# Patient Record
Sex: FEMALE | Race: WHITE | NOT HISPANIC OR LATINO | Employment: UNEMPLOYED | URBAN - METROPOLITAN AREA
[De-identification: names, ages, dates, MRNs, and addresses within clinical notes are randomized per-mention and may not be internally consistent; named-entity substitution may affect disease eponyms.]

---

## 2023-03-21 ENCOUNTER — TELEPHONE (OUTPATIENT)
Dept: PRIMARY CARE | Facility: CLINIC | Age: 53
End: 2023-03-21
Payer: COMMERCIAL

## 2023-03-21 DIAGNOSIS — I10 HYPERTENSION, UNSPECIFIED TYPE: Primary | ICD-10-CM

## 2023-03-21 NOTE — TELEPHONE ENCOUNTER
Pt has been on amlodipine 10mg. For about a month now and her ankles are very swollen and very hard to walk on them especially in the morning. Pt also advise she is very fatigue and doesn't have any energy. Pt wanted to know if there is any other medication she could take. Please call and advise

## 2023-03-22 RX ORDER — LOSARTAN POTASSIUM 25 MG/1
25 TABLET ORAL DAILY
Qty: 30 TABLET | Refills: 0 | Status: SHIPPED | OUTPATIENT
Start: 2023-03-22 | End: 2023-04-13 | Stop reason: SDUPTHER

## 2023-04-13 DIAGNOSIS — I10 HYPERTENSION, UNSPECIFIED TYPE: ICD-10-CM

## 2023-04-13 RX ORDER — LOSARTAN POTASSIUM 25 MG/1
25 TABLET ORAL DAILY
Qty: 90 TABLET | Refills: 0 | Status: SHIPPED | OUTPATIENT
Start: 2023-04-13 | End: 2023-04-17

## 2023-04-13 NOTE — TELEPHONE ENCOUNTER
The patient was requesting a 90 day refill on Losartan - entered. The patient was also asking for a referral to see a surgeon for a prolapse.

## 2023-04-14 DIAGNOSIS — I10 HYPERTENSION, UNSPECIFIED TYPE: ICD-10-CM

## 2023-04-17 RX ORDER — LOSARTAN POTASSIUM 25 MG/1
TABLET ORAL
Qty: 30 TABLET | Refills: 1 | Status: SHIPPED | OUTPATIENT
Start: 2023-04-17 | End: 2023-06-19 | Stop reason: SDUPTHER

## 2023-06-06 ENCOUNTER — TELEPHONE (OUTPATIENT)
Dept: PRIMARY CARE | Facility: CLINIC | Age: 53
End: 2023-06-06
Payer: COMMERCIAL

## 2023-06-06 NOTE — TELEPHONE ENCOUNTER
Pt set up an appt. With Shirley for 7/17/23 for her physical and would like to get her blood work done prior to her appt. Please call and advise when in the system

## 2023-06-07 DIAGNOSIS — Z00.00 ROUTINE GENERAL MEDICAL EXAMINATION AT A HEALTH CARE FACILITY: Primary | ICD-10-CM

## 2023-06-07 NOTE — TELEPHONE ENCOUNTER
Called and left a VM that fasting labs were ordered.    Office number left to call back with questions.

## 2023-06-19 DIAGNOSIS — I10 HYPERTENSION, UNSPECIFIED TYPE: ICD-10-CM

## 2023-06-19 RX ORDER — LOSARTAN POTASSIUM 25 MG/1
25 TABLET ORAL DAILY
Qty: 90 TABLET | Refills: 0 | Status: SHIPPED | OUTPATIENT
Start: 2023-06-19 | End: 2023-07-17 | Stop reason: SDUPTHER

## 2023-06-28 PROBLEM — N83.00 FOLLICULAR CYST OF OVARY: Status: ACTIVE | Noted: 2023-06-28

## 2023-06-28 PROBLEM — N39.0 UTI (URINARY TRACT INFECTION): Status: ACTIVE | Noted: 2023-06-28

## 2023-06-28 PROBLEM — R10.2 PELVIC PAIN IN FEMALE: Status: ACTIVE | Noted: 2023-06-28

## 2023-06-28 PROBLEM — R61 HYPERHIDROSIS: Status: ACTIVE | Noted: 2023-06-28

## 2023-06-28 PROBLEM — I10 BENIGN ESSENTIAL HYPERTENSION: Status: ACTIVE | Noted: 2023-06-28

## 2023-06-28 PROBLEM — F90.8 ADHD, ADULT RESIDUAL TYPE: Status: ACTIVE | Noted: 2023-06-28

## 2023-06-28 PROBLEM — M54.2 CERVICAL PAIN: Status: ACTIVE | Noted: 2023-06-28

## 2023-06-28 PROBLEM — M85.80 OSTEOPENIA: Status: ACTIVE | Noted: 2023-06-28

## 2023-06-28 PROBLEM — N81.2 CYSTOCELE WITH INCOMPLETE UTEROVAGINAL PROLAPSE: Status: ACTIVE | Noted: 2023-06-28

## 2023-06-28 PROBLEM — N81.9 PROLAPSE OF FEMALE PELVIC ORGANS: Status: ACTIVE | Noted: 2023-06-28

## 2023-06-28 PROBLEM — J01.00 ACUTE MAXILLARY SINUSITIS: Status: ACTIVE | Noted: 2023-06-28

## 2023-06-28 PROBLEM — K52.9 COLITIS, ACUTE: Status: ACTIVE | Noted: 2023-06-28

## 2023-06-28 PROBLEM — R35.0 URINARY FREQUENCY: Status: ACTIVE | Noted: 2023-06-28

## 2023-06-28 PROBLEM — F41.9 ANXIETY: Status: ACTIVE | Noted: 2023-06-28

## 2023-06-29 ENCOUNTER — HOSPITAL ENCOUNTER (OUTPATIENT)
Dept: DATA CONVERSION | Facility: HOSPITAL | Age: 53
End: 2023-06-29
Attending: OBSTETRICS & GYNECOLOGY | Admitting: OBSTETRICS & GYNECOLOGY
Payer: COMMERCIAL

## 2023-06-29 DIAGNOSIS — N81.2 INCOMPLETE UTEROVAGINAL PROLAPSE: ICD-10-CM

## 2023-06-29 DIAGNOSIS — N81.9 FEMALE GENITAL PROLAPSE, UNSPECIFIED: ICD-10-CM

## 2023-06-29 DIAGNOSIS — D64.9 ANEMIA, UNSPECIFIED: ICD-10-CM

## 2023-06-29 DIAGNOSIS — I10 ESSENTIAL (PRIMARY) HYPERTENSION: ICD-10-CM

## 2023-06-29 DIAGNOSIS — N72 INFLAMMATORY DISEASE OF CERVIX UTERI: ICD-10-CM

## 2023-06-29 DIAGNOSIS — N80.03 ADENOMYOSIS OF THE UTERUS: ICD-10-CM

## 2023-06-29 DIAGNOSIS — F41.9 ANXIETY DISORDER, UNSPECIFIED: ICD-10-CM

## 2023-06-29 DIAGNOSIS — F90.9 ATTENTION-DEFICIT HYPERACTIVITY DISORDER, UNSPECIFIED TYPE: ICD-10-CM

## 2023-07-10 ENCOUNTER — LAB (OUTPATIENT)
Dept: LAB | Facility: LAB | Age: 53
End: 2023-07-10
Payer: COMMERCIAL

## 2023-07-10 DIAGNOSIS — I10 HYPERTENSION, UNSPECIFIED TYPE: ICD-10-CM

## 2023-07-10 DIAGNOSIS — Z00.00 ROUTINE GENERAL MEDICAL EXAMINATION AT A HEALTH CARE FACILITY: ICD-10-CM

## 2023-07-10 LAB
ALANINE AMINOTRANSFERASE (SGPT) (U/L) IN SER/PLAS: 12 U/L (ref 7–45)
ALBUMIN (G/DL) IN SER/PLAS: 4.5 G/DL (ref 3.4–5)
ALKALINE PHOSPHATASE (U/L) IN SER/PLAS: 54 U/L (ref 33–110)
ANION GAP IN SER/PLAS: 13 MMOL/L (ref 10–20)
ASPARTATE AMINOTRANSFERASE (SGOT) (U/L) IN SER/PLAS: 16 U/L (ref 9–39)
BASOPHILS (10*3/UL) IN BLOOD BY AUTOMATED COUNT: 0.05 X10E9/L (ref 0–0.1)
BASOPHILS/100 LEUKOCYTES IN BLOOD BY AUTOMATED COUNT: 0.7 % (ref 0–2)
BILIRUBIN TOTAL (MG/DL) IN SER/PLAS: 0.4 MG/DL (ref 0–1.2)
CALCIDIOL (25 OH VITAMIN D3) (NG/ML) IN SER/PLAS: 44 NG/ML
CALCIUM (MG/DL) IN SER/PLAS: 9.6 MG/DL (ref 8.6–10.3)
CARBON DIOXIDE, TOTAL (MMOL/L) IN SER/PLAS: 28 MMOL/L (ref 21–32)
CHLORIDE (MMOL/L) IN SER/PLAS: 104 MMOL/L (ref 98–107)
CHOLESTEROL (MG/DL) IN SER/PLAS: 205 MG/DL (ref 0–199)
CHOLESTEROL IN HDL (MG/DL) IN SER/PLAS: 64.8 MG/DL
CHOLESTEROL/HDL RATIO: 3.2
COMPLETE PATHOLOGY REPORT: NORMAL
CONVERTED CLINICAL DIAGNOSIS-HISTORY: NORMAL
CONVERTED FINAL DIAGNOSIS: NORMAL
CONVERTED FINAL REPORT PDF LINK TO COPY AND PASTE: NORMAL
CONVERTED GROSS DESCRIPTION: NORMAL
CREATININE (MG/DL) IN SER/PLAS: 0.81 MG/DL (ref 0.5–1.05)
EOSINOPHILS (10*3/UL) IN BLOOD BY AUTOMATED COUNT: 0.21 X10E9/L (ref 0–0.7)
EOSINOPHILS/100 LEUKOCYTES IN BLOOD BY AUTOMATED COUNT: 3.1 % (ref 0–6)
ERYTHROCYTE DISTRIBUTION WIDTH (RATIO) BY AUTOMATED COUNT: 12.7 % (ref 11.5–14.5)
ERYTHROCYTE MEAN CORPUSCULAR HEMOGLOBIN CONCENTRATION (G/DL) BY AUTOMATED: 32 G/DL (ref 32–36)
ERYTHROCYTE MEAN CORPUSCULAR VOLUME (FL) BY AUTOMATED COUNT: 92 FL (ref 80–100)
ERYTHROCYTES (10*6/UL) IN BLOOD BY AUTOMATED COUNT: 4.12 X10E12/L (ref 4–5.2)
GFR FEMALE: 88 ML/MIN/1.73M2
GLUCOSE (MG/DL) IN SER/PLAS: 82 MG/DL (ref 74–99)
HEMATOCRIT (%) IN BLOOD BY AUTOMATED COUNT: 37.8 % (ref 36–46)
HEMOGLOBIN (G/DL) IN BLOOD: 12.1 G/DL (ref 12–16)
IMMATURE GRANULOCYTES/100 LEUKOCYTES IN BLOOD BY AUTOMATED COUNT: 0.9 % (ref 0–0.9)
LDL: 115 MG/DL (ref 0–99)
LEUKOCYTES (10*3/UL) IN BLOOD BY AUTOMATED COUNT: 6.8 X10E9/L (ref 4.4–11.3)
LYMPHOCYTES (10*3/UL) IN BLOOD BY AUTOMATED COUNT: 1.36 X10E9/L (ref 1.2–4.8)
LYMPHOCYTES/100 LEUKOCYTES IN BLOOD BY AUTOMATED COUNT: 20.1 % (ref 13–44)
MONOCYTES (10*3/UL) IN BLOOD BY AUTOMATED COUNT: 0.67 X10E9/L (ref 0.1–1)
MONOCYTES/100 LEUKOCYTES IN BLOOD BY AUTOMATED COUNT: 9.9 % (ref 2–10)
NEUTROPHILS (10*3/UL) IN BLOOD BY AUTOMATED COUNT: 4.41 X10E9/L (ref 1.2–7.7)
NEUTROPHILS/100 LEUKOCYTES IN BLOOD BY AUTOMATED COUNT: 65.3 % (ref 40–80)
PLATELETS (10*3/UL) IN BLOOD AUTOMATED COUNT: 312 X10E9/L (ref 150–450)
POTASSIUM (MMOL/L) IN SER/PLAS: 4.6 MMOL/L (ref 3.5–5.3)
PROTEIN TOTAL: 7.2 G/DL (ref 6.4–8.2)
SODIUM (MMOL/L) IN SER/PLAS: 140 MMOL/L (ref 136–145)
THYROTROPIN (MIU/L) IN SER/PLAS BY DETECTION LIMIT <= 0.05 MIU/L: 2.33 MIU/L (ref 0.44–3.98)
TRIGLYCERIDE (MG/DL) IN SER/PLAS: 128 MG/DL (ref 0–149)
UREA NITROGEN (MG/DL) IN SER/PLAS: 25 MG/DL (ref 6–23)
VLDL: 26 MG/DL (ref 0–40)

## 2023-07-10 PROCEDURE — 80053 COMPREHEN METABOLIC PANEL: CPT

## 2023-07-10 PROCEDURE — 82306 VITAMIN D 25 HYDROXY: CPT

## 2023-07-10 PROCEDURE — 36415 COLL VENOUS BLD VENIPUNCTURE: CPT

## 2023-07-10 PROCEDURE — 80061 LIPID PANEL: CPT

## 2023-07-10 PROCEDURE — 84443 ASSAY THYROID STIM HORMONE: CPT

## 2023-07-10 PROCEDURE — 85025 COMPLETE CBC W/AUTO DIFF WBC: CPT

## 2023-07-17 ENCOUNTER — TELEPHONE (OUTPATIENT)
Dept: PRIMARY CARE | Facility: CLINIC | Age: 53
End: 2023-07-17

## 2023-07-17 ENCOUNTER — OFFICE VISIT (OUTPATIENT)
Dept: PRIMARY CARE | Facility: CLINIC | Age: 53
End: 2023-07-17
Payer: COMMERCIAL

## 2023-07-17 VITALS
HEIGHT: 60 IN | WEIGHT: 123 LBS | OXYGEN SATURATION: 100 % | RESPIRATION RATE: 17 BRPM | DIASTOLIC BLOOD PRESSURE: 78 MMHG | HEART RATE: 74 BPM | SYSTOLIC BLOOD PRESSURE: 122 MMHG | BODY MASS INDEX: 24.15 KG/M2 | TEMPERATURE: 97.5 F

## 2023-07-17 DIAGNOSIS — Z00.00 ROUTINE ADULT HEALTH MAINTENANCE: Primary | ICD-10-CM

## 2023-07-17 DIAGNOSIS — F90.8 ADHD, ADULT RESIDUAL TYPE: ICD-10-CM

## 2023-07-17 DIAGNOSIS — I10 HYPERTENSION, UNSPECIFIED TYPE: ICD-10-CM

## 2023-07-17 DIAGNOSIS — M54.2 CERVICAL PAIN: ICD-10-CM

## 2023-07-17 DIAGNOSIS — I10 BENIGN ESSENTIAL HYPERTENSION: Chronic | ICD-10-CM

## 2023-07-17 PROCEDURE — 99396 PREV VISIT EST AGE 40-64: CPT | Performed by: NURSE PRACTITIONER

## 2023-07-17 PROCEDURE — 3078F DIAST BP <80 MM HG: CPT | Performed by: NURSE PRACTITIONER

## 2023-07-17 PROCEDURE — 3074F SYST BP LT 130 MM HG: CPT | Performed by: NURSE PRACTITIONER

## 2023-07-17 PROCEDURE — 1036F TOBACCO NON-USER: CPT | Performed by: NURSE PRACTITIONER

## 2023-07-17 RX ORDER — LISDEXAMFETAMINE DIMESYLATE 30 MG/1
30 CAPSULE ORAL
Qty: 90 CAPSULE | Refills: 0 | Status: SHIPPED | OUTPATIENT
Start: 2023-07-17 | End: 2023-08-24 | Stop reason: SDUPTHER

## 2023-07-17 RX ORDER — LOSARTAN POTASSIUM 25 MG/1
25 TABLET ORAL DAILY
Qty: 90 TABLET | Refills: 0 | Status: SHIPPED | OUTPATIENT
Start: 2023-07-17 | End: 2023-08-24 | Stop reason: SDUPTHER

## 2023-07-17 RX ORDER — PRASTERONE 6.5 MG/1
INSERT VAGINAL
COMMUNITY
End: 2024-03-20 | Stop reason: ALTCHOICE

## 2023-07-17 RX ORDER — GABAPENTIN 300 MG/1
300 CAPSULE ORAL NIGHTLY
COMMUNITY
End: 2023-07-17 | Stop reason: SDUPTHER

## 2023-07-17 RX ORDER — GABAPENTIN 300 MG/1
300 CAPSULE ORAL NIGHTLY
Qty: 90 CAPSULE | Refills: 0 | Status: SHIPPED | OUTPATIENT
Start: 2023-07-17 | End: 2023-08-24 | Stop reason: SDUPTHER

## 2023-07-17 RX ORDER — LISDEXAMFETAMINE DIMESYLATE 30 MG/1
30 CAPSULE ORAL
COMMUNITY
End: 2023-07-17 | Stop reason: SDUPTHER

## 2023-07-17 ASSESSMENT — ENCOUNTER SYMPTOMS
DEPRESSION: 0
LOSS OF SENSATION IN FEET: 0
OCCASIONAL FEELINGS OF UNSTEADINESS: 0

## 2023-07-17 ASSESSMENT — COLUMBIA-SUICIDE SEVERITY RATING SCALE - C-SSRS
2. HAVE YOU ACTUALLY HAD ANY THOUGHTS OF KILLING YOURSELF?: NO
6. HAVE YOU EVER DONE ANYTHING, STARTED TO DO ANYTHING, OR PREPARED TO DO ANYTHING TO END YOUR LIFE?: NO
1. IN THE PAST MONTH, HAVE YOU WISHED YOU WERE DEAD OR WISHED YOU COULD GO TO SLEEP AND NOT WAKE UP?: NO

## 2023-07-17 ASSESSMENT — PATIENT HEALTH QUESTIONNAIRE - PHQ9
1. LITTLE INTEREST OR PLEASURE IN DOING THINGS: NOT AT ALL
SUM OF ALL RESPONSES TO PHQ9 QUESTIONS 1 AND 2: 0
2. FEELING DOWN, DEPRESSED OR HOPELESS: NOT AT ALL

## 2023-07-17 ASSESSMENT — PAIN SCALES - GENERAL: PAINLEVEL: 0-NO PAIN

## 2023-07-17 NOTE — PROGRESS NOTES
"Subjective   Patient ID: Silva Chance is a 53 y.o. female who presents for Annual Exam (The patient is here for a complete physical. ).    Well Adult Physical   Patient here for a comprehensive physical exam.The patient reports no problems     History:  LMP: No LMP recorded.  Menopause at Hysterectomy 6/30/23      Had pelvic floor surgery and total hysterectomy 6/30/23         Review of Systems  otherwise negative aside from what was mentioned above in HPI.    Objective   /78 (BP Location: Left arm, Patient Position: Sitting)   Pulse 74   Temp 36.4 °C (97.5 °F)   Resp 17   Ht 1.511 m (4' 11.5\")   Wt 55.8 kg (123 lb)   SpO2 100%   BMI 24.43 kg/m²     Physical Exam  Vitals reviewed.   Constitutional:       Appearance: Normal appearance.   HENT:      Head: Normocephalic.      Right Ear: Tympanic membrane, ear canal and external ear normal.      Left Ear: Tympanic membrane, ear canal and external ear normal.      Nose: Nose normal.      Mouth/Throat:      Mouth: Mucous membranes are moist.   Eyes:      Conjunctiva/sclera: Conjunctivae normal.      Pupils: Pupils are equal, round, and reactive to light.   Cardiovascular:      Rate and Rhythm: Normal rate and regular rhythm.      Pulses: Normal pulses.      Heart sounds: Normal heart sounds.   Pulmonary:      Effort: Pulmonary effort is normal.      Breath sounds: Normal breath sounds.   Abdominal:      General: Abdomen is flat. Bowel sounds are normal.      Palpations: Abdomen is soft.   Musculoskeletal:         General: Normal range of motion.      Cervical back: Normal range of motion and neck supple.   Skin:     General: Skin is warm.   Neurological:      Mental Status: She is alert.   Psychiatric:         Mood and Affect: Mood normal.         Behavior: Behavior normal.         Thought Content: Thought content normal.         Judgment: Judgment normal.         Assessment/Plan   Problem List Items Addressed This Visit          Cardiac and Vasculature    " Benign essential hypertension (Chronic)     Stbale on losartan            Mental Health    ADHD, adult residual type     -CSA and UDS done 11/11/22  -does do medical marijuana  -discussed benefits and risks  -she may consider straterra in the future if Vyvanse makes BP up         Relevant Medications    lisdexamfetamine (Vyvanse) 30 mg capsule       Musculoskeletal and Injuries    Cervical pain - Primary    Relevant Medications    gabapentin (Neurontin) 300 mg capsule     Other Visit Diagnoses       Hypertension, unspecified type        Relevant Medications    losartan (Cozaar) 25 mg tablet                  Health Maintenance  -Pap smear: UTD with PCP in [place].   -Vaccinations: UTD shingrix  -Mammogram: 12/22 normal  -Colonoscopy: 10/2022; repeat 7 years.  -DEXA: in [place]

## 2023-07-17 NOTE — TELEPHONE ENCOUNTER
Pt just had an appt with Shirley and her blood work results were not discussed and pt would like to know the results. Please advise

## 2023-07-17 NOTE — ASSESSMENT & PLAN NOTE
-CSA and UDS done 11/11/22  -does do medical marijuana  -discussed benefits and risks  -she may consider straterra in the future if Vyvanse makes BP up

## 2023-08-24 DIAGNOSIS — F90.8 ADHD, ADULT RESIDUAL TYPE: ICD-10-CM

## 2023-08-24 DIAGNOSIS — I10 HYPERTENSION, UNSPECIFIED TYPE: ICD-10-CM

## 2023-08-24 DIAGNOSIS — M54.2 CERVICAL PAIN: ICD-10-CM

## 2023-08-24 RX ORDER — GABAPENTIN 300 MG/1
300 CAPSULE ORAL NIGHTLY
Qty: 90 CAPSULE | Refills: 0 | Status: SHIPPED | OUTPATIENT
Start: 2023-08-24 | End: 2023-11-06 | Stop reason: SDUPTHER

## 2023-08-24 RX ORDER — LISDEXAMFETAMINE DIMESYLATE 30 MG/1
30 CAPSULE ORAL
Qty: 90 CAPSULE | Refills: 0 | Status: SHIPPED | OUTPATIENT
Start: 2023-08-24 | End: 2023-09-11 | Stop reason: ALTCHOICE

## 2023-08-24 RX ORDER — LOSARTAN POTASSIUM 25 MG/1
25 TABLET ORAL DAILY
Qty: 90 TABLET | Refills: 0 | Status: SHIPPED | OUTPATIENT
Start: 2023-08-24 | End: 2023-11-06 | Stop reason: SDUPTHER

## 2023-09-11 ENCOUNTER — OFFICE VISIT (OUTPATIENT)
Dept: PRIMARY CARE | Facility: CLINIC | Age: 53
End: 2023-09-11
Payer: COMMERCIAL

## 2023-09-11 VITALS
SYSTOLIC BLOOD PRESSURE: 122 MMHG | BODY MASS INDEX: 25.42 KG/M2 | OXYGEN SATURATION: 98 % | WEIGHT: 128 LBS | RESPIRATION RATE: 16 BRPM | HEART RATE: 77 BPM | TEMPERATURE: 97.2 F | DIASTOLIC BLOOD PRESSURE: 88 MMHG

## 2023-09-11 DIAGNOSIS — Z12.31 ENCOUNTER FOR SCREENING MAMMOGRAM FOR MALIGNANT NEOPLASM OF BREAST: ICD-10-CM

## 2023-09-11 DIAGNOSIS — F40.243 ANXIETY WITH FLYING: ICD-10-CM

## 2023-09-11 DIAGNOSIS — F90.8 ADHD, ADULT RESIDUAL TYPE: ICD-10-CM

## 2023-09-11 DIAGNOSIS — I10 BENIGN ESSENTIAL HYPERTENSION: Chronic | ICD-10-CM

## 2023-09-11 DIAGNOSIS — N81.2 CYSTOCELE WITH INCOMPLETE UTEROVAGINAL PROLAPSE: Primary | ICD-10-CM

## 2023-09-11 PROBLEM — N39.0 UTI (URINARY TRACT INFECTION): Status: RESOLVED | Noted: 2023-06-28 | Resolved: 2023-09-11

## 2023-09-11 PROBLEM — J01.00 ACUTE MAXILLARY SINUSITIS: Status: RESOLVED | Noted: 2023-06-28 | Resolved: 2023-09-11

## 2023-09-11 PROCEDURE — 3079F DIAST BP 80-89 MM HG: CPT | Performed by: INTERNAL MEDICINE

## 2023-09-11 PROCEDURE — 1036F TOBACCO NON-USER: CPT | Performed by: INTERNAL MEDICINE

## 2023-09-11 PROCEDURE — 99214 OFFICE O/P EST MOD 30 MIN: CPT | Performed by: INTERNAL MEDICINE

## 2023-09-11 PROCEDURE — 3074F SYST BP LT 130 MM HG: CPT | Performed by: INTERNAL MEDICINE

## 2023-09-11 RX ORDER — LORAZEPAM 0.5 MG/1
0.5 TABLET ORAL DAILY PRN
Qty: 7 TABLET | Refills: 0 | Status: SHIPPED | OUTPATIENT
Start: 2023-09-11 | End: 2023-12-18 | Stop reason: ALTCHOICE

## 2023-09-11 RX ORDER — ATOMOXETINE 40 MG/1
CAPSULE ORAL
Qty: 60 CAPSULE | Refills: 0 | Status: SHIPPED | OUTPATIENT
Start: 2023-09-11 | End: 2023-12-18 | Stop reason: ALTCHOICE

## 2023-09-11 ASSESSMENT — ENCOUNTER SYMPTOMS
PALPITATIONS: 0
MYALGIAS: 0
CONSTIPATION: 0
CHEST TIGHTNESS: 0
BLOOD IN STOOL: 0
POLYDIPSIA: 0
RHINORRHEA: 0
DYSURIA: 0
ARTHRALGIAS: 0
FREQUENCY: 0
COUGH: 0
WHEEZING: 0
NERVOUS/ANXIOUS: 0
HEMATURIA: 0
SHORTNESS OF BREATH: 0
FEVER: 0
DIZZINESS: 0
WOUND: 0
HEADACHES: 0
POLYPHAGIA: 0
NAUSEA: 0
EYE PAIN: 0
VOMITING: 0
SORE THROAT: 0
CHILLS: 0
UNEXPECTED WEIGHT CHANGE: 0
DIARRHEA: 0
ABDOMINAL PAIN: 0
DYSPHORIC MOOD: 0

## 2023-09-11 ASSESSMENT — PATIENT HEALTH QUESTIONNAIRE - PHQ9
1. LITTLE INTEREST OR PLEASURE IN DOING THINGS: NOT AT ALL
2. FEELING DOWN, DEPRESSED OR HOPELESS: NOT AT ALL
SUM OF ALL RESPONSES TO PHQ9 QUESTIONS 1 AND 2: 0

## 2023-09-11 NOTE — PROGRESS NOTES
Subjective   Patient ID: Silva Chance is a 53 y.o. female who presents for Medication Question (Pt is not taking the vyvanse- it raises her BP).    HPI   Silva is here to discuss ADHD  She cannot do Vyvanse anymore. Causes her BP to be up and does not feel well    She would like to try something    She was on bupropion postpartum for depression but does not remember if helped ADHD at that time as a lot was going on    She is going to [place] at the end of the month. Has flight anxiety and requests ativan for this    She had hysterectomy for her prolapse. Wants to do pelvic floor therapy.     She is only going to be in [place] for 2 months at a time from now on. Wants to swap mammograms her    Son and DIL having daughter in November and will help out. She is excited for this  Review of Systems   Constitutional:  Negative for chills, fever and unexpected weight change.   HENT:  Negative for congestion, hearing loss, rhinorrhea and sore throat.    Eyes:  Negative for pain and visual disturbance.   Respiratory:  Negative for cough, chest tightness, shortness of breath and wheezing.    Cardiovascular:  Negative for chest pain and palpitations.   Gastrointestinal:  Negative for abdominal pain, blood in stool, constipation, diarrhea, nausea and vomiting.   Endocrine: Negative for cold intolerance, heat intolerance, polydipsia and polyphagia.   Genitourinary:  Negative for dysuria, frequency and hematuria.   Musculoskeletal:  Negative for arthralgias and myalgias.   Skin:  Negative for rash and wound.   Neurological:  Negative for dizziness, syncope and headaches.   Psychiatric/Behavioral:  Negative for dysphoric mood. The patient is not nervous/anxious.        Objective   /88   Pulse 77   Temp 36.2 °C (97.2 °F)   Resp 16   Wt 58.1 kg (128 lb)   SpO2 98%   BMI 25.42 kg/m²     Physical Exam  Constitutional:       Appearance: Normal appearance.   Cardiovascular:      Rate and Rhythm: Normal rate and regular rhythm.       Heart sounds: Normal heart sounds. No murmur heard.     No gallop.   Pulmonary:      Effort: Pulmonary effort is normal. No respiratory distress.      Breath sounds: Normal breath sounds.   Musculoskeletal:      Right lower leg: No edema.      Left lower leg: No edema.   Neurological:      Mental Status: She is alert.         Assessment/Plan   Problem List Items Addressed This Visit       ADHD, adult residual type    Relevant Medications    atomoxetine (Strattera) 40 mg capsule    Benign essential hypertension (Chronic)    Cystocele with incomplete uterovaginal prolapse - Primary    Relevant Orders    Referral to Physical Therapy     Other Visit Diagnoses       Encounter for screening mammogram for malignant neoplasm of breast        Relevant Orders    [facility] mammo bilateral screening tomosynthesis    Anxiety with flying        Relevant Medications    LORazepam (Ativan) 0.5 mg tablet             HTN:controlled on Losartan     Ovarian cyst-on intrarosa--asks if I can send     Colitis: resolved     Anemia: increase iron in diet     ADHD: stop Vyvanse as causing high BP and does not want to take anymore  -trial straterra. Titrate to 80 mg. Discussed side effects with it.  -if no difference-can trial bupropion    S/p hyst with hx of prolapse-send to pelvic floor therapy     Flight anxiety/travel  -given seasick patches  -given 7 tabs of ativan. Advised no alcohol     Recurrent UTI  -macrobid prn     Hx of galactorrhea and had surgery     Hyperhidrosis: does botox with derm     Health Maintenance  -Pap smear: s/p hyst with cervix removed  -Vaccinations: UTD shingrix, tdap, flu  -Mammogram: 12/22 normal  -Colonoscopy: 10/22- in [place]  -DEXA: in [place]     . Lives in [place]. 2nd home in [place]  Owns [employer]

## 2023-09-29 VITALS
DIASTOLIC BLOOD PRESSURE: 88 MMHG | TEMPERATURE: 97.9 F | HEART RATE: 70 BPM | SYSTOLIC BLOOD PRESSURE: 134 MMHG | RESPIRATION RATE: 16 BRPM | WEIGHT: 123.24 LBS | HEIGHT: 61 IN | BODY MASS INDEX: 23.27 KG/M2

## 2023-09-30 NOTE — H&P
History of Present Illness:   Pregnant/Lactating:  ·  Are You Pregnant no   ·  Are You Currently Breastfeeding no     History Present Illness:  Reason for surgery: Uterovaginal Prolapse   HPI:    53y with stage 2 uterovaginal prolapse presents for total vaginal hysterectomy, bilateral salpingectomy, possible oophorectomy, SSLS, APR, and cystoscopy.     POP-Q: Stage: 2 and patient was sitting. Aa: +0.5. Ba: +0.5 C: -4.5 Gh: 4.5. Pb: 5 TVL: 10 Ap: +0.5. Bp: =0.5. D: -7.   UDS: no DO or ELLA; h/o MUS () with second mesh sling placed after  TVUS 3/2/23 @[facility]: small nabothian cysts, otherwise unremarkable pelvic US     ObHx: C/S x1,  x1  GynHx: h/o endometrial ablation, post-menopausal, uses estrace, takes gabapentin for hot flashes  PMH: HTN  PSH: mesh sling for ELLA x2,  x1, endometrial ablation        Allergies:        Allergies:  ·  No Known Allergies :        Intolerances:  ·  Demerol : Nausea/Vomiting    Home Medication Review:   Home Medications Reviewed: yes     Impression/Procedure:   ·  Impression and Planned Procedure: total vaginal hysterectomy, bilateral salpingectomy, possible oophorectomy, SSLS, APR, and cystoscopy       ERAS (Enhanced Recovery After Surgery):  ·  ERAS Patient: yes   ·  CPM/PAT Utilization: yes   ·  Immunonutrition Recovery Drink Utilization: no   ·  Carbohydrate Supplement Drink Utilization: no     Review of Systems:   Review of Systems:  All Other Systems: All other systems reviewed and  are negative       Vital Signs:  Temperature C: 36.6 degrees C   Temperature F: 97.8 degrees F   Heart Rate: 70 beats per minute   Respiratory Rate: 16 breath per minute   Blood Pressure Systolic: 134 mm/Hg   Blood Pressure Diastolic: 88 mm/Hg     Physical Exam by System:    Constitutional: no acute distress   Eyes: PERRL, EOMI   ENMT: MMM   Head/Neck: normocephalic, atraumatic   Respiratory/Thorax: no excessive respiratory effort   Cardiovascular: warm, well perfused   Gastrointestinal:  def to OR   Genitourinary: def to OR   Musculoskeletal: moving all 4 extremities equally   Neurological: alert and oriented x3   Psychological: mood and behavior appropriate   Skin: warm, dry     Consent:   COVID-19 Consent:  ·  COVID-19 Risk Consent Surgeon has reviewed key risks related to the risk of anais COVID-19 and if they contract COVID-19 what the risks are.     Attestation:   Note Completion:  I am a:  Resident/Fellow   Attending Attestation I saw and evaluated the patient.  I personally obtained the key and critical portions of the history and physical exam or was physically present for key and  critical portions performed by the resident/fellow. I reviewed the resident/fellow?s documentation and discussed the patient with the resident/fellow.  I agree with the resident/fellow?s medical decision making as documented in the note.     I personally evaluated the patient on 29-Jun-2023   Comments/ Additional Findings    Saw patient, discussed plan for TVH, BS, possible SSLS vs. USLS, APR and cysto.   r/b/a discussed.   To OR for planned procedure    Chelsi Moreno MD, MPH, FACOG          Electronic Signatures:  Aurora Noel (Resident))  (Signed 29-Jun-2023 06:52)   Authored: History of Present Illness, Allergies, Home  Medication Review, Impression/Procedure, ERAS, Review of Systems, Physical Exam, Consent, Note Completion  Chelsi Moreno)  (Signed 29-Jun-2023 11:54)   Authored: Note Completion   Co-Signer: History of Present Illness, Allergies, Home Medication Review, Impression/Procedure, ERAS, Review of Systems, Physical Exam,  Consent, Note Completion      Last Updated: 29-Jun-2023 11:54 by Chelsi Moreno)

## 2023-10-02 NOTE — OP NOTE
Post Operative Note:     PreOp Diagnosis: Stage 2 uterovaginal prolapse   Post-Procedure Diagnosis: same   Procedure: 1. total vaginal hysterectomy    2. bilateral salpingectomy  3. uterosacral ligament suspension  4. anterior repair  5. posterior repair  6. perineorrhaphy  7. cystoscopy   Surgeon: ZEESHAN Moreno   Resident/Fellow/Other Assistant: SOFY Hernandez   Anesthesia: GETA   I.V. Fluids: 1600 cc crystalloid   Estimated Blood Loss (mL): 100   Blood Replacement: none   Specimen: yes. uterus, cervix, bilateral fallopian  tubes   Complications: none   Findings: stage 2 POP with excellent correction at  conclusion of case, intact bladder with bilateral UO efflux   Patient Returned To/Condition: PACU/stable   Urine Output: 525 cc   Drains and/or Catheters: Silverio catheter     Operative Report Dictated:  Dictation: not applicable - note contains Operative  Report   Operative Report:    Procedure: A general anesthetic was administered and the patient was placed in the dorsal lithotomy position in [place] stirrups, using care to position so as to  avoid any nerve injury. She was then prepped and draped in the normal sterile fashion. A Silverio catheter was placed.     Total Vaginal Hysterectomy  Vasopressin was injected circumferentially around the cervix, and the colpotomy made with the scalpel. The posterior cul-de-sac was entered carefully with sharp dissection and the long weighted speculum was placed in the cul-de-sac. The space between  the uterus and bladder was identified and carefully dissected until anterior cul de sac entry had been obtained. A Betito technique was used sequentially for transection and suture ligation of the uterosacral and cardinal ligaments as well as the uterine  arteries, respectively. The utero-ovarian ligaments were then clamped and transected. 2-0 Vicryl sutures were used throughout. The bilateral fallopian tubes were similarly clamped off and suture ligated. The ovaries  were normal appearing and were left  in-situ. The pedicles were inspected and good hemostasis was assured.     USLS  The bowel was then packed with one lap then elevated and the long posterior weighted speculum was used to deviate the rectosigmoid, the uterosacral ligaments were grasped 3 centimeters above the ischial spine with a long Allis clamp. Three stitches of  0 PDS were placed through each uterosacral ligament on each side. Cystoscopy at this time was normal and the stitches were tied. Cystoscopy was again performed and showed a normal bladder without evidence of injury.    Anterior repair  An elliptical incision was made in the anterior wall. The pubocervical fascia was then plicated in the midline with a 3-0 PDS suture. The vaginal epithelium was then reapproximated with 2-0 Vicryl. Cystoscopy was repeated and there was no injury to the  bladder or ureters seen.    Posterior repair   Attention was then turned to the posterior repair/perineorrhaphy. This was performed by excising a triangular-shaped wedge of posterior vaginal wall after infiltration of dilute Vasopressin. The rectovaginal fascia was reapproximated with interrupted  2-0 PDS sutures and the bulbocavernosus muscles were reapproximated with a 2-0 PDS suture. The vaginal epithelium was reapproximated with a 2-0 Vicryl in a running fashion.     The patient tolerated the procedure well. Sponge, lap, and needle counts were correct x 2. The patient received Ancef for antibiotics prior to the procedure. She was taken to the recovery room in stable condition.    Patty Capone MD - FPMRS Fellow    Dr. Moreno was present for the complete procedure.    Attestation:   Note Completion:  I am a: Resident/Fellow   Attending Attestation I was present for the entire procedure    Comments/ Additional Findings    I was present and scrubbed for the entire procedure.   Chelsi Moreno MD          Electronic Signatures:  Chelsi Moreno)  (Signed 29-Jun-2023  12:14)   Authored: Post Operative Note, Note Completion   Co-Signer: Post Operative Note, Note Completion  Patty Capone (Fellow))  (Signed 29-Jun-2023 11:26)   Authored: Post Operative Note, Note Completion      Last Updated: 29-Jun-2023 12:14 by Chelsi Moreno)

## 2023-10-25 ENCOUNTER — TELEPHONE (OUTPATIENT)
Dept: OBSTETRICS AND GYNECOLOGY | Facility: CLINIC | Age: 53
End: 2023-10-25
Payer: COMMERCIAL

## 2023-10-25 NOTE — TELEPHONE ENCOUNTER
Pt had a  Procedure: 1. total vaginal hysterectomy    2. bilateral salpingectomy  3. uterosacral ligament suspension  4. anterior repair  5. posterior repair  6. perineorrhaphy  7. cystoscopy   On 6/29/23 with Dr. Moreno.    She was seen for a cervical cuff cauterization and stitch trim by Dr. Hendricks 9/18/23. Pt states that she is still having pain with IC and that she feels something scratchy when she inserts her intrarosa vaginal supp. She is not sure if it it a stitch. Denies NVFC an abnormal discharge. She was advised to follow up for assessment. Reviewed no IC until appt and to cont intrarosa. FUV was scheduled 10/27/23.    Breath sounds clear and equal bilaterally.

## 2023-10-27 ENCOUNTER — OFFICE VISIT (OUTPATIENT)
Dept: OBSTETRICS AND GYNECOLOGY | Facility: CLINIC | Age: 53
End: 2023-10-27
Payer: COMMERCIAL

## 2023-10-27 VITALS
HEART RATE: 79 BPM | TEMPERATURE: 98 F | DIASTOLIC BLOOD PRESSURE: 85 MMHG | RESPIRATION RATE: 16 BRPM | SYSTOLIC BLOOD PRESSURE: 147 MMHG

## 2023-10-27 DIAGNOSIS — Z18.9 RETAINED SUTURE, INITIAL ENCOUNTER: Primary | ICD-10-CM

## 2023-10-27 DIAGNOSIS — T81.89XA RETAINED SUTURE, INITIAL ENCOUNTER: Primary | ICD-10-CM

## 2023-10-27 DIAGNOSIS — N94.10 DYSPAREUNIA IN FEMALE: ICD-10-CM

## 2023-10-27 PROCEDURE — 99213 OFFICE O/P EST LOW 20 MIN: CPT | Performed by: STUDENT IN AN ORGANIZED HEALTH CARE EDUCATION/TRAINING PROGRAM

## 2023-10-27 PROCEDURE — 3079F DIAST BP 80-89 MM HG: CPT | Performed by: STUDENT IN AN ORGANIZED HEALTH CARE EDUCATION/TRAINING PROGRAM

## 2023-10-27 PROCEDURE — 1036F TOBACCO NON-USER: CPT | Performed by: STUDENT IN AN ORGANIZED HEALTH CARE EDUCATION/TRAINING PROGRAM

## 2023-10-27 PROCEDURE — 3077F SYST BP >= 140 MM HG: CPT | Performed by: STUDENT IN AN ORGANIZED HEALTH CARE EDUCATION/TRAINING PROGRAM

## 2023-10-27 RX ORDER — ONABOTULINUMTOXINA 100 [USP'U]/1
INJECTION, POWDER, LYOPHILIZED, FOR SOLUTION INTRADERMAL; INTRAMUSCULAR
COMMUNITY
Start: 2023-09-12 | End: 2024-03-26 | Stop reason: SDUPTHER

## 2023-10-27 RX ORDER — CALCIUM CARB/VITAMIN D3/VIT K1 500-500-40
TABLET,CHEWABLE ORAL
COMMUNITY

## 2023-10-27 SDOH — ECONOMIC STABILITY: FOOD INSECURITY: WITHIN THE PAST 12 MONTHS, THE FOOD YOU BOUGHT JUST DIDN'T LAST AND YOU DIDN'T HAVE MONEY TO GET MORE.: NEVER TRUE

## 2023-10-27 SDOH — ECONOMIC STABILITY: FOOD INSECURITY: WITHIN THE PAST 12 MONTHS, YOU WORRIED THAT YOUR FOOD WOULD RUN OUT BEFORE YOU GOT MONEY TO BUY MORE.: NEVER TRUE

## 2023-10-27 ASSESSMENT — ENCOUNTER SYMPTOMS
DEPRESSION: 0
LOSS OF SENSATION IN FEET: 0
OCCASIONAL FEELINGS OF UNSTEADINESS: 0

## 2023-10-27 ASSESSMENT — COLUMBIA-SUICIDE SEVERITY RATING SCALE - C-SSRS
6. HAVE YOU EVER DONE ANYTHING, STARTED TO DO ANYTHING, OR PREPARED TO DO ANYTHING TO END YOUR LIFE?: NO
1. IN THE PAST MONTH, HAVE YOU WISHED YOU WERE DEAD OR WISHED YOU COULD GO TO SLEEP AND NOT WAKE UP?: NO
2. HAVE YOU ACTUALLY HAD ANY THOUGHTS OF KILLING YOURSELF?: NO

## 2023-10-27 ASSESSMENT — PAIN SCALES - GENERAL: PAINLEVEL: 0-NO PAIN

## 2023-10-27 NOTE — PROGRESS NOTES
HISTORY OF PRESENT ILLNESS:  Silva Chance is a 53 y.o. female, who presents in follow up for POP    S/p TVH, BS, USLS, A/P repair and cystoscopy    During our last encounter on 23 we reviewed and agreed to the followin. Uterovaginal prolapse, postop  - We trimmed her suture tails and discussed that she might notice some plastic-like material expelled from her vagina over the next few days which is the suture tails we trimmed     2. Dyspareunia, granulation tissue  - The patient reports attempting intercourse but endorsed severe dyspareunia with insertion described as a burning pain despite using lubricant.  - Upon exam there was evidence of granulation tissue near the apex/cuff and her pain was triggered specifically when opening the speculum near the cuff (no pain with initial insertion of speculum)  - Encouraged her to try a silicone-base lubricant prior to intercourse (i.e. Astroglide or Uberlube).   - Planning to start PFPT soon.      3. History of ELLA  - She has a hx of MUS placement in  with a second mesh sling placed afterwards.   - The patient is not endorsing sx of ELLA.      Follow-up in 1 year with Dr. Chelsi Moreno for annual mesh check/pelvic exam, sooner if any issues.     Today she reports   Difficulty with intrarosa insertion. Feeling something poking near introitus. Wondering if it's a stitch. Pain with intercourse since surgery. Tried lubricants but hasn't helped.     Scheduled for PFPT, hasn't had first appointment yet.          PHYSICAL EXAMINATION:  No LMP recorded.  There is no height or weight on file to calculate BMI.  There were no vitals taken for this visit.    General Appearance: well appearing  Neuro: Alert and oriented   HEENT: mucous membranes moist, neck supple  Resp: No respiratory distress, normal work of breathing  MSK: normal range of motion, gait appropriate    Pelvic:  Chaperone for pelvic exam:   Genitourinary:  normal external genitalia, Bartholin's glands,  [place]'s glands negative,   Urethra normal meatus, non-tender, no periurethral mass  Vaginal mucosa with small suture end noted on anterior vaginal wall, trimmed on exam. USLS sutures still noted at vaginal cuff but not tender on palpation/manipulation and did not correlate with patient description of areas of discomfort during intercourse or with insertion of intrarosa  Cervix absent  Uterus absent  Atrophy negative    POP-Q (in supine position):  No significant prolapse    Rectal: no hemorrhoids, fissures or masses.  Urine dip:  No results found for this or any previous visit.       IMPRESSION AND PLAN:  52 y/o s/p TVH, BS, USLS, A/P repair and cystoscopy by Dr. Moreno    Post op care  - suture trimmed from anterior vaginal wall, suspected PDS suture  - continue intrarosa to help with vaginal healing    Dyspareunia  - suspect secondary to introital pain as this was the area of pain with intercourse- -start PFPT as planned 11/22/23    All questions and concerns were answered and addressed.  The patient expressed understanding and agrees with the plan.     RTC with Dr. Moreno 7/2024 one year from surgery date, RTC here as needed    Marcie Hendricks MD

## 2023-11-06 DIAGNOSIS — M54.2 CERVICAL PAIN: ICD-10-CM

## 2023-11-06 DIAGNOSIS — I10 BENIGN ESSENTIAL HYPERTENSION: Primary | Chronic | ICD-10-CM

## 2023-11-06 DIAGNOSIS — I10 HYPERTENSION, UNSPECIFIED TYPE: ICD-10-CM

## 2023-11-06 RX ORDER — GABAPENTIN 300 MG/1
300 CAPSULE ORAL NIGHTLY
Qty: 90 CAPSULE | Refills: 0 | Status: SHIPPED | OUTPATIENT
Start: 2023-11-06 | End: 2024-01-18

## 2023-11-06 RX ORDER — LOSARTAN POTASSIUM 50 MG/1
50 TABLET ORAL DAILY
Qty: 90 TABLET | Refills: 1 | Status: SHIPPED | OUTPATIENT
Start: 2023-11-06 | End: 2024-05-06 | Stop reason: SDUPTHER

## 2023-11-06 NOTE — TELEPHONE ENCOUNTER
Pt of Dr Garcia. Has an upcoming appt in January that we had to cancel due to her going to be in [place]. She just saw Dr Hendricks on 10/27 and she believes her BP medication, for the Losartan 25mg, needs adjusting. Her BP was at 146/90 and she has had headaches and neck aches as well. She is asking for a possible December appointment if possible. I did try to offer her another provider before she advised me she needed to cancel the January appointment. Please advise if meds can be adjusted. Thank you!

## 2023-11-20 ENCOUNTER — TELEPHONE (OUTPATIENT)
Dept: PRIMARY CARE | Facility: CLINIC | Age: 53
End: 2023-11-20
Payer: COMMERCIAL

## 2023-11-20 NOTE — TELEPHONE ENCOUNTER
Pt called with a 2 week update and states that her BP has been running 119/87 133/88.    That is with her monitor at home.    She thinks that it is not that accurate, it is probably a high result (per her daughter in law it was always higher then at her doctors office).    Pt did not give actually readings.    Please advise.

## 2023-11-22 ENCOUNTER — LAB (OUTPATIENT)
Dept: LAB | Facility: LAB | Age: 53
End: 2023-11-22
Payer: COMMERCIAL

## 2023-11-22 ENCOUNTER — EVALUATION (OUTPATIENT)
Dept: PHYSICAL THERAPY | Facility: CLINIC | Age: 53
End: 2023-11-22
Payer: COMMERCIAL

## 2023-11-22 DIAGNOSIS — N81.2 CYSTOCELE WITH INCOMPLETE UTEROVAGINAL PROLAPSE: ICD-10-CM

## 2023-11-22 DIAGNOSIS — I10 BENIGN ESSENTIAL HYPERTENSION: Chronic | ICD-10-CM

## 2023-11-22 DIAGNOSIS — R10.2 PELVIC PAIN IN FEMALE: Primary | ICD-10-CM

## 2023-11-22 LAB
ANION GAP SERPL CALC-SCNC: 12 MMOL/L (ref 10–20)
BUN SERPL-MCNC: 25 MG/DL (ref 6–23)
CALCIUM SERPL-MCNC: 9.4 MG/DL (ref 8.6–10.3)
CHLORIDE SERPL-SCNC: 101 MMOL/L (ref 98–107)
CO2 SERPL-SCNC: 29 MMOL/L (ref 21–32)
CREAT SERPL-MCNC: 0.79 MG/DL (ref 0.5–1.05)
GFR SERPL CREATININE-BSD FRML MDRD: >90 ML/MIN/1.73M*2
GLUCOSE SERPL-MCNC: 82 MG/DL (ref 74–99)
POTASSIUM SERPL-SCNC: 3.9 MMOL/L (ref 3.5–5.3)
SODIUM SERPL-SCNC: 138 MMOL/L (ref 136–145)

## 2023-11-22 PROCEDURE — 97110 THERAPEUTIC EXERCISES: CPT | Mod: GP | Performed by: PHYSICAL THERAPIST

## 2023-11-22 PROCEDURE — 97162 PT EVAL MOD COMPLEX 30 MIN: CPT | Mod: GP | Performed by: PHYSICAL THERAPIST

## 2023-11-22 PROCEDURE — 36415 COLL VENOUS BLD VENIPUNCTURE: CPT

## 2023-11-22 PROCEDURE — 97140 MANUAL THERAPY 1/> REGIONS: CPT | Mod: GP | Performed by: PHYSICAL THERAPIST

## 2023-11-22 PROCEDURE — 80048 BASIC METABOLIC PNL TOTAL CA: CPT

## 2023-11-22 ASSESSMENT — PAIN - FUNCTIONAL ASSESSMENT: PAIN_FUNCTIONAL_ASSESSMENT: 0-10

## 2023-11-22 ASSESSMENT — PAIN SCALES - GENERAL: PAINLEVEL_OUTOF10: 8

## 2023-11-22 NOTE — PROGRESS NOTES
Physical Therapy    Physical Therapy Evaluation and Treatment      Patient Name: Silva Chance  MRN: [id number]  Today's Date: 11/22/2023  Time Calculation  Start Time: 0310  Stop Time: 0410  Time Calculation (min): 60 min  Visit 1/8 per poc    Assessment:  Patient presents with Post-op Pelvic Pain limiting intercourse.    Problems include pelvic alignment/sacral position, soft tissue condition, strength, and decreased knowledge of HEP.    Response to treatment-  No residual discomfort after treatment.       Goals:   Patient will report decreased episodes of leaking by  100% overall.    2.    Increase pelvic symmetry/sacral position/soft tissue condition to good.   3.    Patient will demonstrate good TA control with Level 3 exercises.    4.    Patient will demonstrate the ability to contract-relax-and eccentrically lengthen her pelvic floor.    5.    Patient will have intercourse without discomfort.     6.    NIH-CPSI =   2 or less.     7.    Patient will be independent with a home exercise program.     Plan:  OP PT Plan  Treatment/Interventions: Biofeedback, Education/ Instruction, Manual therapy, Neuromuscular re-education, Therapeutic exercises  PT Plan: Skilled PT  PT Frequency: 1 time per week  Duration: 8 weeks, 8 visits  Rehab Potential: Excellent  Plan of Care Agreement: Patient    Current Problem:   1. Pelvic pain in female        2. Cystocele with incomplete uterovaginal prolapse  Referral to Physical Therapy          Subjective    COMPLAINT/REPORT:  Recovering from surgery, but felt like something was not right.  September 2023, cuff cauterization and sutures trimmed.                           Bowel-  Evacuates daily without pain.  However, prior to surgery was having constipation and cramping.      Bladder-  Daytime frequency normal.  PM 0-1x.  Leaks with exercise if she is not mindful of bracing.      [place]-  Once.  Painful.  No history of painful intercourse.                         Precautions/PMH:  1 .  ! Vaginal Delivery with Episiotomy.  1st bladder surgery .   Ablation and 2nd bladder surgery with mesh.  2023 TVH/Bilateral salpingectomy/U-S ligament suspension/A & P repair/perineorrhaphy/cystoscopy.    Precautions  Precautions Comment: No falls.     Pain:  Pain Assessment  Pain Assessment: 0-10  Pain Score: 8  with intercourse.   Patient also has bilateral hip discomfort.      Outcome Measures:  Other Outcome Measures: NIH-CPSI = 7      Objective   INSPECTION:  Posture-  Slight increased lumbar lordosis/APT.  Gait/Stairs-  No device.                          TRUNK MOBILITY:  WNL.                                       PELVIS/SI:  Stand-  ASIS/PSIS (=).  Right ilium IR.  Right SI deep.                     Sit-  (=).  Right femur looks long.                      Supine-  ASIS R low, L high.  LLE looks short.                     Prone-  PSIS R high, L low.      ROM/FLEXIBILITY:  BLE's WNL.                                   STRENGTH:  MMT-  Seated LE's 5/5.                         Isolated Contractions-  PF Good.  TA Fair (Slow to release).                       Resting Tone-  n/a.                         PALPATION:  External-  Decreased myofascial mobility lumbar paraspinals.  Abdominal scar is mobile.  Lower abdomen thick.  Perineum tight.  Internal- Vaginal TP/tightness at VO/Layer 1 posterior vaginal wall.      OTHER/SPECIAL:  Q#- n/a.      Treatments:  TherX- Education initiated on the core/PF function.  Global vs. Local muscles.  Continue yoga with emphasis on hip openers/goddess pose.                  MTT-  Perineal STM.  Internal vaginal TP releases and gentle stretching with light-mod pressure, 1 and 2 finger, and swooping.  Instructed in self or spouse assisted technique digitally or with use of a vibrator.

## 2023-11-29 ENCOUNTER — TREATMENT (OUTPATIENT)
Dept: PHYSICAL THERAPY | Facility: CLINIC | Age: 53
End: 2023-11-29
Payer: COMMERCIAL

## 2023-11-29 DIAGNOSIS — R10.2 PELVIC PAIN IN FEMALE: ICD-10-CM

## 2023-11-29 PROCEDURE — 97110 THERAPEUTIC EXERCISES: CPT | Mod: GP | Performed by: PHYSICAL THERAPIST

## 2023-11-29 PROCEDURE — 97140 MANUAL THERAPY 1/> REGIONS: CPT | Mod: GP | Performed by: PHYSICAL THERAPIST

## 2023-11-29 ASSESSMENT — PAIN - FUNCTIONAL ASSESSMENT: PAIN_FUNCTIONAL_ASSESSMENT: 0-10

## 2023-11-29 ASSESSMENT — PAIN SCALES - GENERAL: PAINLEVEL_OUTOF10: 8

## 2023-11-29 NOTE — PROGRESS NOTES
Physical Therapy    Physical Therapy Treatment    Patient Name: Silva Chance  MRN: [id number]  Today's Date: 11/29/2023  Time Calculation  Start Time: 0300  Stop Time: 0400  Time Calculation (min): 60 min  Visit 2/8 per poc    Assessment:  Improved pelvic alignment.  Improved soft tissue condition.  Improved L-P dissociation with practice.     Plan:  Continue to progress as tolerated.      Current Problem  1. Pelvic pain in female  Follow Up In Physical Therapy              Subjective  Patient initiated self internal stretching digitally and with vibrator.  Unsure if she should get pelvic wand instead.    Reports concern over bilateral hip discomfort (in bed and sitting).      Precautions  Precautions  Precautions Comment: No falls.     Pain  Pain Assessment  Pain Assessment: 0-10  Pain Score: 8  with intercourse.    Objective     Pelvis-  Right anterior vs. Left posterior ilial rotation.    L-P dissociation limited.    Obers (+), but passing midline.    Osiel (+) psoas.          Treatments:  TherX-  discussed dilators, pelvic wand, crystal wand and vibrators.  Reviewed self-internal techniques.    Reviewed and practiced hip openers/spine stretch (Suzan, Butterfly, Cows Head, Cows Head with Spinal Twist, Hip Flexor stretch in 1/2 kneel or Osiel test, Cat/Cow, Tamara Pose.      MTT-  Supine:  Pelvic balancing.  STM bilateral iliacus/psoas.  TP release at pubes.    Prone:  STM along iliac crests, to T-L paraspinals/QL.  Sacral mobs/traction.  Lumbar rotation glides.

## 2023-12-06 ENCOUNTER — APPOINTMENT (OUTPATIENT)
Dept: PHYSICAL THERAPY | Facility: CLINIC | Age: 53
End: 2023-12-06
Payer: COMMERCIAL

## 2023-12-12 ENCOUNTER — ANCILLARY PROCEDURE (OUTPATIENT)
Dept: RADIOLOGY | Facility: CLINIC | Age: 53
End: 2023-12-12
Payer: COMMERCIAL

## 2023-12-12 DIAGNOSIS — Z12.31 ENCOUNTER FOR SCREENING MAMMOGRAM FOR MALIGNANT NEOPLASM OF BREAST: ICD-10-CM

## 2023-12-12 PROCEDURE — 77067 SCR MAMMO BI INCL CAD: CPT | Mod: BILATERAL PROCEDURE | Performed by: RADIOLOGY

## 2023-12-12 PROCEDURE — 77063 BREAST TOMOSYNTHESIS BI: CPT | Mod: BILATERAL PROCEDURE | Performed by: RADIOLOGY

## 2023-12-12 PROCEDURE — 77067 SCR MAMMO BI INCL CAD: CPT

## 2023-12-13 ENCOUNTER — HOSPITAL ENCOUNTER (OUTPATIENT)
Dept: RADIOLOGY | Facility: EXTERNAL LOCATION | Age: 53
Discharge: HOME | End: 2023-12-13

## 2023-12-13 ENCOUNTER — TELEPHONE (OUTPATIENT)
Dept: DERMATOLOGY | Facility: CLINIC | Age: 53
End: 2023-12-13
Payer: COMMERCIAL

## 2023-12-13 ENCOUNTER — TREATMENT (OUTPATIENT)
Dept: PHYSICAL THERAPY | Facility: CLINIC | Age: 53
End: 2023-12-13
Payer: COMMERCIAL

## 2023-12-13 DIAGNOSIS — R10.2 PELVIC PAIN IN FEMALE: ICD-10-CM

## 2023-12-13 PROCEDURE — 97140 MANUAL THERAPY 1/> REGIONS: CPT | Mod: GP | Performed by: PHYSICAL THERAPIST

## 2023-12-13 PROCEDURE — 97110 THERAPEUTIC EXERCISES: CPT | Mod: GP | Performed by: PHYSICAL THERAPIST

## 2023-12-13 NOTE — TELEPHONE ENCOUNTER
Call placed to [facility] to set up delivery of pts Botox for upcoming appt. Delivery is set for 12/20/23.

## 2023-12-16 ASSESSMENT — PAIN SCALES - GENERAL: PAINLEVEL_OUTOF10: 4

## 2023-12-16 ASSESSMENT — PAIN - FUNCTIONAL ASSESSMENT: PAIN_FUNCTIONAL_ASSESSMENT: 0-10

## 2023-12-16 NOTE — PROGRESS NOTES
Physical Therapy    Physical Therapy Treatment    Patient Name: Silva Chance  MRN: [id number]  Today's Date: 12-13-23     Visit 3/8 per poc    Assessment:  Improved pelvic alignment.  Improved soft tissue condition. Decreased pain.       Plan:  Continue to progress as tolerated.      Current Problem  1. Pelvic pain in female  Follow Up In Physical Therapy              Subjective  Patient got a pelvic wand.  Had intercourse with significant reduction in pain.    Patient is stretching and doing Yoga.    Reports bilateral hip discomfort (in bed and sitting) is reducing as well.      Precautions  Precautions  Precautions Comment: No falls.     Pain  Pain Assessment  Pain Assessment: 0-10  Pain Score: 4  with intercourse.    Objective     Pelvis-  (=).  L-P dissociation improving.   Obers (+), but passing midline.    Osiel (+) psoas.          Treatments:  TherX-  Reviewed use of pelvic wand.   Reviewed self-internal techniques.    Reviewed hip openers/spine stretch (Suzan, Butterfly, Cows Head, Cows Head with Spinal Twist, Hip Flexor stretch in 1/2 kneel or Osiel test.  Reviewed and practiced  Cat/Cow, Tamara Pose, Bird dog.      Education on core/PF muscle function.  Slow vs. Fast twitch.  TA progression with Level 2, Level 3 march, Level 3 walk-out.      MTT-  Supine:  STM bilateral iliacus/psoas, abdominals.  Scar mobs.  TP release at pubes.      Prone:  STM along iliac crests, to T-L paraspinals/QL.  Sacral mobs/traction.  Lumbar rotation glides/UPA's.  STM with movement from Cat to Child's Pose.

## 2023-12-18 ENCOUNTER — OFFICE VISIT (OUTPATIENT)
Dept: OBSTETRICS AND GYNECOLOGY | Facility: CLINIC | Age: 53
End: 2023-12-18
Payer: COMMERCIAL

## 2023-12-18 VITALS
BODY MASS INDEX: 24.35 KG/M2 | WEIGHT: 129 LBS | DIASTOLIC BLOOD PRESSURE: 62 MMHG | SYSTOLIC BLOOD PRESSURE: 132 MMHG | HEIGHT: 61 IN

## 2023-12-18 DIAGNOSIS — N95.2 VAGINAL ATROPHY: Primary | ICD-10-CM

## 2023-12-18 DIAGNOSIS — N95.1 MENOPAUSAL SYMPTOMS: ICD-10-CM

## 2023-12-18 PROCEDURE — 3075F SYST BP GE 130 - 139MM HG: CPT | Performed by: STUDENT IN AN ORGANIZED HEALTH CARE EDUCATION/TRAINING PROGRAM

## 2023-12-18 PROCEDURE — 3078F DIAST BP <80 MM HG: CPT | Performed by: STUDENT IN AN ORGANIZED HEALTH CARE EDUCATION/TRAINING PROGRAM

## 2023-12-18 PROCEDURE — 99214 OFFICE O/P EST MOD 30 MIN: CPT | Performed by: STUDENT IN AN ORGANIZED HEALTH CARE EDUCATION/TRAINING PROGRAM

## 2023-12-18 PROCEDURE — 1036F TOBACCO NON-USER: CPT | Performed by: STUDENT IN AN ORGANIZED HEALTH CARE EDUCATION/TRAINING PROGRAM

## 2023-12-18 RX ORDER — ESTRADIOL 0.1 MG/G
CREAM VAGINAL
Qty: 42.5 G | Refills: 2 | Status: SHIPPED | OUTPATIENT
Start: 2023-12-18 | End: 2024-03-20 | Stop reason: SDUPTHER

## 2023-12-18 ASSESSMENT — PAIN SCALES - GENERAL: PAINLEVEL: 0-NO PAIN

## 2023-12-18 NOTE — PROGRESS NOTES
HISTORY OF PRESENT ILLNESS:  Silva Chance is a 53 y.o. female, who presents in follow up for post op from TVH,BS, USLS, A/P repair by Dr. Moreno 23.     During last encounter on 10/27/23, reviewed and agreed to the followin y/o s/p TVH, BS, USLS, A/P repair and cystoscopy by Dr. Moreno     Post op care  - suture trimmed from anterior vaginal wall, suspected PDS suture  - continue intrarosa to help with vaginal healing     Dyspareunia  - suspect secondary to introital pain as this was the area of pain with intercourse- -start PFPT as planned 23    Today she reports   PFPT has been going very well. Doing well from surgery recovery standpoint. Here for menopause questions, advised by PFPT to see physician for this. Questioning intrarosa and wondering if vaginal estrogen would be better. Also with lots of joint pain.       PHYSICAL EXAMINATION:  No LMP recorded.  There is no height or weight on file to calculate BMI.  There were no vitals taken for this visit.    General Appearance: well appearing  Neuro: Alert and oriented   HEENT: mucous membranes moist, neck supple  Resp: No respiratory distress, normal work of breathing  MSK: normal range of motion, gait appropriate    IMPRESSION AND PLAN:  Silva Chance is a 53 y.o. who presents in follow up for post op from TVH,BS, USLS, A/P repair by Dr. Moreno 23.     Vaginal atrophy  - discussed transitioning from intrarosa to vaginal estrogen  - rx for estradiol sent to preferred pharmacy  - reviewed instructions for use    Menopausal symptoms  - hot flashes, joint pains  - currently taking gabapentin  - advised follow up with general gynecology, could discuss HRT or other options    All questions and concerns were answered and addressed.  The patient expressed understanding and agrees with the plan.     RTC as needed for post-op issues    Marcie Hendricks MD

## 2023-12-20 ENCOUNTER — APPOINTMENT (OUTPATIENT)
Dept: PHYSICAL THERAPY | Facility: CLINIC | Age: 53
End: 2023-12-20
Payer: COMMERCIAL

## 2023-12-26 ENCOUNTER — APPOINTMENT (OUTPATIENT)
Dept: DERMATOLOGY | Facility: CLINIC | Age: 53
End: 2023-12-26
Payer: COMMERCIAL

## 2023-12-27 ENCOUNTER — APPOINTMENT (OUTPATIENT)
Dept: PHYSICAL THERAPY | Facility: CLINIC | Age: 53
End: 2023-12-27
Payer: COMMERCIAL

## 2023-12-28 ENCOUNTER — APPOINTMENT (OUTPATIENT)
Dept: PHYSICAL THERAPY | Facility: CLINIC | Age: 53
End: 2023-12-28
Payer: COMMERCIAL

## 2023-12-29 ENCOUNTER — PROCEDURE VISIT (OUTPATIENT)
Dept: DERMATOLOGY | Facility: CLINIC | Age: 53
End: 2023-12-29
Payer: COMMERCIAL

## 2023-12-29 DIAGNOSIS — R61 HYPERHIDROSIS: Primary | ICD-10-CM

## 2023-12-29 PROCEDURE — 64650 CHEMODENERV ECCRINE GLANDS: CPT | Performed by: DERMATOLOGY

## 2023-12-29 ASSESSMENT — PATIENT GLOBAL ASSESSMENT (PGA): PATIENT GLOBAL ASSESSMENT: PATIENT GLOBAL ASSESSMENT:  4 - SEVERE

## 2023-12-29 ASSESSMENT — DERMATOLOGY PATIENT ASSESSMENT
HAVE YOU HAD OR DO YOU HAVE A STAPH INFECTION: NO
DO YOU HAVE IRREGULAR MENSTRUAL CYCLES: NO
FOR PATIENTS COMING IN FOR A FOLLOW-UP VISIT - HAVE THERE BEEN ANY CHANGES IN YOUR HEALTH SINCE YOUR LAST VISIT: NO
DO YOU HAVE ANY NEW OR CHANGING LESIONS: NO
DO YOU USE SUNSCREEN: OCCASIONALLY
HAVE YOU HAD OR DO YOU HAVE VASCULAR DISEASE: NO
ARE YOU AN ORGAN TRANSPLANT RECIPIENT: NO
ARE YOU TRYING TO GET PREGNANT: NO
DO YOU USE A TANNING BED: YES, PREVIOUSLY
ARE YOU ON BIRTH CONTROL: NO

## 2023-12-29 ASSESSMENT — DERMATOLOGY QUALITY OF LIFE (QOL) ASSESSMENT
ARE THERE EXCLUSIONS OR EXCEPTIONS FOR THE QUALITY OF LIFE ASSESSMENT: NO
WHAT SINGLE SKIN CONDITION LISTED BELOW IS THE PATIENT ANSWERING THE QUALITY-OF-LIFE ASSESSMENT QUESTIONS ABOUT: NONE OF THE ABOVE
RATE HOW BOTHERED YOU ARE BY SYMPTOMS OF YOUR SKIN PROBLEM (EG, ITCHING, STINGING BURNING, HURTING OR SKIN IRRITATION): 0 - NEVER BOTHERED
RATE HOW BOTHERED YOU ARE BY EFFECTS OF YOUR SKIN PROBLEMS ON YOUR ACTIVITIES (EG, GOING OUT, ACCOMPLISHING WHAT YOU WANT, WORK ACTIVITIES OR YOUR RELATIONSHIPS WITH OTHERS): 0 - NEVER BOTHERED
DATE THE QUALITY-OF-LIFE ASSESSMENT WAS COMPLETED: 66837
RATE HOW EMOTIONALLY BOTHERED YOU ARE BY YOUR SKIN PROBLEM (FOR EXAMPLE, WORRY, EMBARRASSMENT, FRUSTRATION): 0 - NEVER BOTHERED

## 2023-12-29 ASSESSMENT — ITCH NUMERIC RATING SCALE: HOW SEVERE IS YOUR ITCHING?: 0

## 2023-12-29 NOTE — PROGRESS NOTES
Subjective     Silva Chance is a 53 y.o. female who presents for the following: Excessive Sweating (Bilateral Axillae. Has received Botox in the past with good results. Pt reports tx is helpful. ).     Review of Systems:  No other skin or systemic complaints other than what is documented elsewhere in the note.    The following portions of the chart were reviewed this encounter and updated as appropriate:          Skin Cancer History  No skin cancer on file.      Specialty Problems    None       Objective   Well appearing patient in no apparent distress; mood and affect are within normal limits.    A focused skin examination was performed of bilateral axilla. All findings within normal limits unless otherwise noted below.    Assessment/Plan   1. Hyperhidrosis (2)  Left Axilla, Right Axilla  Excessive sweating with the skin visibly damp.    Hyperhidrosis is a common condition in which a person sweats excessively. The sweating may affect the whole of your body, or it may only affect certain areas. Commonly affected areas include the armpits, palms and soles.   It is believed to be due to an underlying thermoregulatory dysfunction.  Treatments include topical therapy with Drysol (which may be drying and irritating to the skin), topical Qbrexza cloths, oral anti-cholinergic medication, iontophoresis devices and in recalcitrant cases can consider Botox injection.    Chemodenervation - Left Axilla, Right Axilla    Date/Time: 12/29/2023 12:06 PM    Performed by: Allison Hargrove DO  Authorized by: Allison Hargrove, DO  not cosmetic  Medical - Botulinum toxin injection:  yes    Consent:      Consent obtained:  Written     Consent given by:  Patient     Risks discussed:  Weakness     Alternatives discussed:  No treatment    Universal protocol:      Relevant documents present and verified:  Yes       Site/side verified:  Yes       Immediately prior to procedure a time out was called:  Yes       Patient identity confirmed:   Verbally with patient    Pre-procedure details:   Prep type:  Chlorhexidine    Procedure details:      Diluted by:  Preservative free saline     Toxin (Brand):  OnaBoNT-A (Botox)     Concentration (u/mL):  2U/0.1mL    Total units injected:  100U (50U each axilla)    Post-procedure details:      Patient tolerance of procedure:  Tolerated well, no immediate complications    Related Procedures  Follow Up In Dermatology - Established Patient  Follow Up In Dermatology - Established Patient    Related Medications  onabotulinumtoxinA (Botox) injection 100 Units

## 2024-01-16 ENCOUNTER — APPOINTMENT (OUTPATIENT)
Dept: PRIMARY CARE | Facility: CLINIC | Age: 54
End: 2024-01-16
Payer: COMMERCIAL

## 2024-01-17 DIAGNOSIS — M54.2 CERVICAL PAIN: ICD-10-CM

## 2024-01-18 RX ORDER — GABAPENTIN 300 MG/1
300 CAPSULE ORAL NIGHTLY
Qty: 90 CAPSULE | Refills: 0 | Status: SHIPPED | OUTPATIENT
Start: 2024-01-18 | End: 2024-02-23 | Stop reason: SDUPTHER

## 2024-02-23 DIAGNOSIS — M54.2 CERVICAL PAIN: ICD-10-CM

## 2024-02-23 RX ORDER — GABAPENTIN 300 MG/1
300 CAPSULE ORAL NIGHTLY
Qty: 90 CAPSULE | Refills: 0 | Status: SHIPPED | OUTPATIENT
Start: 2024-02-23 | End: 2024-05-06 | Stop reason: SDUPTHER

## 2024-03-11 ENCOUNTER — TELEPHONE (OUTPATIENT)
Dept: DERMATOLOGY | Facility: CLINIC | Age: 54
End: 2024-03-11

## 2024-03-11 ENCOUNTER — APPOINTMENT (OUTPATIENT)
Dept: PRIMARY CARE | Facility: CLINIC | Age: 54
End: 2024-03-11
Payer: COMMERCIAL

## 2024-03-11 NOTE — TELEPHONE ENCOUNTER
Call placed to [facility] pharmacy. Pts Botox is set to be delivered to [place] 3/15/24.

## 2024-03-12 ENCOUNTER — APPOINTMENT (OUTPATIENT)
Dept: DERMATOLOGY | Facility: CLINIC | Age: 54
End: 2024-03-12
Payer: COMMERCIAL

## 2024-03-20 ENCOUNTER — OFFICE VISIT (OUTPATIENT)
Dept: OBSTETRICS AND GYNECOLOGY | Facility: CLINIC | Age: 54
End: 2024-03-20
Payer: COMMERCIAL

## 2024-03-20 VITALS
WEIGHT: 127 LBS | HEIGHT: 61 IN | SYSTOLIC BLOOD PRESSURE: 122 MMHG | DIASTOLIC BLOOD PRESSURE: 82 MMHG | BODY MASS INDEX: 23.98 KG/M2

## 2024-03-20 DIAGNOSIS — M25.551 BILATERAL HIP PAIN: ICD-10-CM

## 2024-03-20 DIAGNOSIS — N95.2 VAGINAL ATROPHY: ICD-10-CM

## 2024-03-20 DIAGNOSIS — Z01.419 WELL WOMAN EXAM: Primary | ICD-10-CM

## 2024-03-20 DIAGNOSIS — M25.552 BILATERAL HIP PAIN: ICD-10-CM

## 2024-03-20 PROCEDURE — 3079F DIAST BP 80-89 MM HG: CPT | Performed by: OBSTETRICS & GYNECOLOGY

## 2024-03-20 PROCEDURE — 3074F SYST BP LT 130 MM HG: CPT | Performed by: OBSTETRICS & GYNECOLOGY

## 2024-03-20 PROCEDURE — 1036F TOBACCO NON-USER: CPT | Performed by: OBSTETRICS & GYNECOLOGY

## 2024-03-20 PROCEDURE — 99396 PREV VISIT EST AGE 40-64: CPT | Performed by: OBSTETRICS & GYNECOLOGY

## 2024-03-20 RX ORDER — ESTRADIOL 0.1 MG/G
CREAM VAGINAL
Qty: 42.5 G | Refills: 2 | Status: SHIPPED | OUTPATIENT
Start: 2024-03-20

## 2024-03-20 NOTE — PROGRESS NOTES
I saw and evaluated the patient. I personally obtained the key and critical portions of the history and physical exam or was physically present for key and critical portions performed by the resident/fellow. I reviewed the resident/fellow's documentation and discussed the patient with the resident/fellow. I agree with the resident/fellow's medical decision making as documented in the note.    Patty Don MD

## 2024-03-20 NOTE — PROGRESS NOTES
"Silva Chance is a 53 y.o. here to establish care.    S/p TVH, BS, USLS, AR/AR, perineorraphy and mesh sling placement.     Had ablation years ago, so unsure when she went through menopause.     Last year feeling emotional. Has has some weight gain since 30s and fatigue.     Hip pain that limits sitting still. Movement/standing is better for the pain. Painful even to sit down and watch tv for short periods of time. Stretching has not helped.    Was having pain with intercourse, now using vaginal estrogen twice weekly and PFPT.     Takes gabapentin and has been having hot flashes.     GynHx: remote h/o abnormal paps, no JOS II-III    Social History     Substance and Sexual Activity   Sexual Activity Yes     Social History: splits time between [facility] and [place], takes care of grandby during  Exercise: 4x/week at the gym, weight baring, lots of stretching  Past med hx and past surg hx reviewed and notable for: HTN on losartan, ADHD    Prevention:   - 12/2023: BIRADS-2  - Colonoscopy at age 52     Objective   /82   Ht 1.549 m (5' 1\")   Wt 57.6 kg (127 lb)   BMI 24.00 kg/m²     General: Well appearing, alert  HEENT: normocephalic, EOMI, clear sclera  Cardio: Warm and well perfused  Resp: breathing comfortably on room air  Neuro: grossly intact, no focal deficits  Extremities: full ROM, no calf tenderness  Psych: A&O x3, appropriate mood and affect    Assessment and Plan:    Problem List Items Addressed This Visit       Vaginal atrophy    Relevant Medications    estradiol (Estrace) 0.01 % (0.1 mg/gram) vaginal cream    Well woman exam - Primary    Relevant Medications    estradiol (Estrace) 0.01 % (0.1 mg/gram) vaginal cream  Discussed healthy diet and lifestyle  Declined breast exam, up to date with mammo    Bilateral hip pain    Relevant Orders    Symptoms not completely c/w arthritis, will refer for further workup  Referral to Rheumatology    Referral to Physical Therapy      Orders Placed This Encounter "   Procedures    Referral to Rheumatology     Standing Status:   Future     Standing Expiration Date:   9/20/2024     Referral Priority:   Routine     Referral Type:   Consultation     Referral Reason:   Specialty Services Required     Requested Specialty:   Rheumatology     Number of Visits Requested:   1    Referral to Physical Therapy     Standing Status:   Future     Standing Expiration Date:   9/20/2024     Referral Priority:   Routine     Referral Type:   Consultation     Referral Reason:   Specialty Services Required     Requested Specialty:   Physical Therapy     Number of Visits Requested:   1      Return in 1 year for annual or earlier prn    Discussed and seen with Dr. Arian Lainez MD

## 2024-03-21 ENCOUNTER — TELEPHONE (OUTPATIENT)
Dept: PRIMARY CARE | Facility: CLINIC | Age: 54
End: 2024-03-21
Payer: COMMERCIAL

## 2024-03-21 NOTE — TELEPHONE ENCOUNTER
Pt called in regards to feeling a sinus cold. Asking for a possible visit today? Please advise if she may be added in. Thank you!

## 2024-03-26 ENCOUNTER — PROCEDURE VISIT (OUTPATIENT)
Dept: DERMATOLOGY | Facility: CLINIC | Age: 54
End: 2024-03-26
Payer: COMMERCIAL

## 2024-03-26 DIAGNOSIS — L74.510 PRIMARY FOCAL HYPERHIDROSIS OF AXILLA: Primary | ICD-10-CM

## 2024-03-26 PROCEDURE — 11900 INJECT SKIN LESIONS </W 7: CPT | Performed by: DERMATOLOGY

## 2024-03-26 RX ORDER — ONABOTULINUMTOXINA 100 [USP'U]/1
INJECTION, POWDER, LYOPHILIZED, FOR SOLUTION INTRADERMAL; INTRAMUSCULAR
Qty: 1 EACH | Refills: 3 | Status: SHIPPED | OUTPATIENT
Start: 2024-03-26

## 2024-03-26 RX ORDER — ONABOTULINUMTOXINA 100 [USP'U]/1
INJECTION, POWDER, LYOPHILIZED, FOR SOLUTION INTRADERMAL; INTRAMUSCULAR
Qty: 1 EACH | Refills: 3 | Status: SHIPPED | OUTPATIENT
Start: 2024-03-26 | End: 2024-03-26 | Stop reason: SDUPTHER

## 2024-03-26 ASSESSMENT — DERMATOLOGY PATIENT ASSESSMENT
HAVE YOU HAD OR DO YOU HAVE VASCULAR DISEASE: NO
ARE YOU TRYING TO GET PREGNANT: NO
ARE YOU ON BIRTH CONTROL: NO
DO YOU HAVE IRREGULAR MENSTRUAL CYCLES: NO
DO YOU USE A TANNING BED: NO
ARE YOU AN ORGAN TRANSPLANT RECIPIENT: NO
DO YOU USE SUNSCREEN: DAILY
HAVE YOU HAD OR DO YOU HAVE A STAPH INFECTION: NO
FOR PATIENTS COMING IN FOR A FOLLOW-UP VISIT - HAVE THERE BEEN ANY CHANGES IN YOUR HEALTH SINCE YOUR LAST VISIT: NO
DO YOU HAVE ANY NEW OR CHANGING LESIONS: NO

## 2024-03-26 ASSESSMENT — DERMATOLOGY QUALITY OF LIFE (QOL) ASSESSMENT
WHAT SINGLE SKIN CONDITION LISTED BELOW IS THE PATIENT ANSWERING THE QUALITY-OF-LIFE ASSESSMENT QUESTIONS ABOUT: NONE OF THE ABOVE
RATE HOW BOTHERED YOU ARE BY EFFECTS OF YOUR SKIN PROBLEMS ON YOUR ACTIVITIES (EG, GOING OUT, ACCOMPLISHING WHAT YOU WANT, WORK ACTIVITIES OR YOUR RELATIONSHIPS WITH OTHERS): 0 - NEVER BOTHERED
RATE HOW EMOTIONALLY BOTHERED YOU ARE BY YOUR SKIN PROBLEM (FOR EXAMPLE, WORRY, EMBARRASSMENT, FRUSTRATION): 6 - ALWAYS BOTHERED
DATE THE QUALITY-OF-LIFE ASSESSMENT WAS COMPLETED: 66925
RATE HOW BOTHERED YOU ARE BY SYMPTOMS OF YOUR SKIN PROBLEM (EG, ITCHING, STINGING BURNING, HURTING OR SKIN IRRITATION): 6 - ALWAYS BOTHERED
ARE THERE EXCLUSIONS OR EXCEPTIONS FOR THE QUALITY OF LIFE ASSESSMENT: NO

## 2024-03-26 ASSESSMENT — ITCH NUMERIC RATING SCALE: HOW SEVERE IS YOUR ITCHING?: 0

## 2024-03-26 ASSESSMENT — PATIENT GLOBAL ASSESSMENT (PGA): PATIENT GLOBAL ASSESSMENT: PATIENT GLOBAL ASSESSMENT:  4 - SEVERE

## 2024-03-26 NOTE — PROGRESS NOTES
Subjective     Silva Chance is a 53 y.o. female who presents for the following: Excessive Sweating (Silva Chance is a 53 y.o. female who presents for the following: Excessive Sweating in Bilateral Axillae. Has received Botox in the past with good results. Pt reports tx is helpful.).   Hyperhidrosis severity index is 3 out of 4 on medication, it is 1 out of 4.   Previously had tried other topical deodorants which were not helpful including many years ago did Certain dri.    Review of Systems:  No other skin or systemic complaints other than what is documented elsewhere in the note.    The following portions of the chart were reviewed this encounter and updated as appropriate:          Skin Cancer History  No skin cancer on file.      Specialty Problems    None       Objective   Well appearing patient in no apparent distress; mood and affect are within normal limits.    A focused skin examination was performed. All findings within normal limits unless otherwise noted below.    Assessment/Plan   1. Primary focal hyperhidrosis of axilla (2)  Left Axilla, Right Axilla  Excessive sweating with the skin visibly damp.    Hyperhidrosis is a common condition in which a person sweats excessively. The sweating may affect the whole of your body, or it may only affect certain areas. Commonly affected areas include the armpits, palms and soles.   It is believed to be due to an underlying thermoregulatory dysfunction.  Treatments include topical therapy with Drysol (which may be drying and irritating to the skin), topical Qbrexza cloths, oral anti-cholinergic medication, iontophoresis devices and in recalcitrant cases can consider Botox injection.    Patient has been doing well on Botox injections for years, needs new PA - submitted rx to  specialty pharmacy.      Chemodenervation - Left Axilla, Right Axilla    Date/Time: 3/26/2024 12:00 PM    Performed by: Allison Hargrove DO  Authorized by: Allison Hargrove DO  not  cosmetic  Medical - Botulinum toxin injection:  yes    Consent:      Consent obtained:  Written     Consent given by:  Patient     Risks discussed:  Bleeding, infection and pain     Alternatives discussed:  No treatment and observation    Universal protocol:      Relevant documents present and verified:  Yes       Site/side verified:  Yes       Immediately prior to procedure a time out was called:  Yes       Patient identity confirmed:  Verbally with patient    Pre-procedure details:   Prep type:  Chlorhexidine    Procedure details:      Diluted by:  Preservative free saline     Toxin (Brand):  OnaBoNT-A (Botox)     Concentration (u/mL):  2U/0.1mL    Total units injected:  100    Post-procedure details:      Patient tolerance of procedure:  Tolerated well, no immediate complications    Comments: 50 units each axilla, 100 units total, patient provided medication from pharmacy    Related Procedures  Follow Up In Dermatology - Established Patient  Follow Up In Dermatology - Established Patient    Related Medications  onabotulinumtoxinA (Botox) injection 100 Units      Botox 100 unit injection  PHYSICIAN TO INJECT 50 UNITS TO EACH AXILLA EVERY 3 MONTHS      Addendum for Botox supplied by patient

## 2024-03-28 ENCOUNTER — SPECIALTY PHARMACY (OUTPATIENT)
Dept: PHARMACY | Facility: CLINIC | Age: 54
End: 2024-03-28

## 2024-04-04 ENCOUNTER — HOSPITAL ENCOUNTER (OUTPATIENT)
Dept: RADIOLOGY | Facility: CLINIC | Age: 54
Discharge: HOME | End: 2024-04-04
Payer: COMMERCIAL

## 2024-04-04 ENCOUNTER — OFFICE VISIT (OUTPATIENT)
Dept: RHEUMATOLOGY | Facility: CLINIC | Age: 54
End: 2024-04-04
Payer: COMMERCIAL

## 2024-04-04 ENCOUNTER — OFFICE VISIT (OUTPATIENT)
Dept: PRIMARY CARE | Facility: CLINIC | Age: 54
End: 2024-04-04
Payer: COMMERCIAL

## 2024-04-04 ENCOUNTER — LAB (OUTPATIENT)
Dept: LAB | Facility: LAB | Age: 54
End: 2024-04-04
Payer: COMMERCIAL

## 2024-04-04 VITALS
HEART RATE: 72 BPM | WEIGHT: 129.8 LBS | SYSTOLIC BLOOD PRESSURE: 133 MMHG | HEIGHT: 60 IN | BODY MASS INDEX: 25.48 KG/M2 | RESPIRATION RATE: 16 BRPM | OXYGEN SATURATION: 99 % | DIASTOLIC BLOOD PRESSURE: 93 MMHG | TEMPERATURE: 98.4 F

## 2024-04-04 VITALS
HEART RATE: 70 BPM | WEIGHT: 129.8 LBS | TEMPERATURE: 97.9 F | HEIGHT: 60 IN | SYSTOLIC BLOOD PRESSURE: 128 MMHG | BODY MASS INDEX: 25.48 KG/M2 | DIASTOLIC BLOOD PRESSURE: 85 MMHG

## 2024-04-04 DIAGNOSIS — M25.551 BILATERAL HIP PAIN: ICD-10-CM

## 2024-04-04 DIAGNOSIS — M54.50 CHRONIC LOW BACK PAIN, UNSPECIFIED BACK PAIN LATERALITY, UNSPECIFIED WHETHER SCIATICA PRESENT: Primary | ICD-10-CM

## 2024-04-04 DIAGNOSIS — M54.50 CHRONIC LOW BACK PAIN, UNSPECIFIED BACK PAIN LATERALITY, UNSPECIFIED WHETHER SCIATICA PRESENT: ICD-10-CM

## 2024-04-04 DIAGNOSIS — F90.8 ADHD, ADULT RESIDUAL TYPE: ICD-10-CM

## 2024-04-04 DIAGNOSIS — M25.552 BILATERAL HIP PAIN: ICD-10-CM

## 2024-04-04 DIAGNOSIS — G89.29 CHRONIC LOW BACK PAIN, UNSPECIFIED BACK PAIN LATERALITY, UNSPECIFIED WHETHER SCIATICA PRESENT: Primary | ICD-10-CM

## 2024-04-04 DIAGNOSIS — G89.29 CHRONIC LOW BACK PAIN, UNSPECIFIED BACK PAIN LATERALITY, UNSPECIFIED WHETHER SCIATICA PRESENT: ICD-10-CM

## 2024-04-04 DIAGNOSIS — R53.83 OTHER FATIGUE: Primary | ICD-10-CM

## 2024-04-04 DIAGNOSIS — I10 BENIGN ESSENTIAL HYPERTENSION: ICD-10-CM

## 2024-04-04 DIAGNOSIS — R53.83 OTHER FATIGUE: ICD-10-CM

## 2024-04-04 DIAGNOSIS — R06.02 SOB (SHORTNESS OF BREATH): ICD-10-CM

## 2024-04-04 PROBLEM — N80.03 ADENOMYOSIS OF UTERUS: Status: ACTIVE | Noted: 2023-06-29

## 2024-04-04 PROBLEM — N94.10 DYSPAREUNIA IN FEMALE: Status: ACTIVE | Noted: 2024-04-04

## 2024-04-04 PROBLEM — N64.52 DISCHARGE FROM NIPPLE: Status: ACTIVE | Noted: 2019-11-14

## 2024-04-04 PROBLEM — F90.9 ATTENTION DEFICIT HYPERACTIVITY DISORDER (ADHD): Status: ACTIVE | Noted: 2024-04-04

## 2024-04-04 PROBLEM — N72 CERVICITIS: Status: ACTIVE | Noted: 2023-06-29

## 2024-04-04 PROBLEM — D64.9 ANEMIA: Status: ACTIVE | Noted: 2023-06-29

## 2024-04-04 LAB
ALBUMIN SERPL BCP-MCNC: 4.4 G/DL (ref 3.4–5)
ALP SERPL-CCNC: 65 U/L (ref 33–110)
ALT SERPL W P-5'-P-CCNC: 11 U/L (ref 7–45)
ANION GAP SERPL CALC-SCNC: 11 MMOL/L (ref 10–20)
AST SERPL W P-5'-P-CCNC: 17 U/L (ref 9–39)
BASOPHILS # BLD AUTO: 0.03 X10*3/UL (ref 0–0.1)
BASOPHILS NFR BLD AUTO: 0.4 %
BILIRUB SERPL-MCNC: 0.5 MG/DL (ref 0–1.2)
BUN SERPL-MCNC: 15 MG/DL (ref 6–23)
CALCIUM SERPL-MCNC: 9.4 MG/DL (ref 8.6–10.6)
CHLORIDE SERPL-SCNC: 103 MMOL/L (ref 98–107)
CO2 SERPL-SCNC: 29 MMOL/L (ref 21–32)
CREAT SERPL-MCNC: 0.78 MG/DL (ref 0.5–1.05)
CRP SERPL-MCNC: 0.21 MG/DL
EGFRCR SERPLBLD CKD-EPI 2021: >90 ML/MIN/1.73M*2
EOSINOPHIL # BLD AUTO: 0.1 X10*3/UL (ref 0–0.7)
EOSINOPHIL NFR BLD AUTO: 1.3 %
ERYTHROCYTE [DISTWIDTH] IN BLOOD BY AUTOMATED COUNT: 12.6 % (ref 11.5–14.5)
ERYTHROCYTE [SEDIMENTATION RATE] IN BLOOD BY WESTERGREN METHOD: 10 MM/H (ref 0–30)
FSH SERPL-ACNC: 39.5 IU/L
GLUCOSE SERPL-MCNC: 79 MG/DL (ref 74–99)
HCT VFR BLD AUTO: 39.4 % (ref 36–46)
HGB BLD-MCNC: 12.7 G/DL (ref 12–16)
IMM GRANULOCYTES # BLD AUTO: 0.05 X10*3/UL (ref 0–0.7)
IMM GRANULOCYTES NFR BLD AUTO: 0.7 % (ref 0–0.9)
LYMPHOCYTES # BLD AUTO: 1.26 X10*3/UL (ref 1.2–4.8)
LYMPHOCYTES NFR BLD AUTO: 17 %
MCH RBC QN AUTO: 29.1 PG (ref 26–34)
MCHC RBC AUTO-ENTMCNC: 32.2 G/DL (ref 32–36)
MCV RBC AUTO: 90 FL (ref 80–100)
MONOCYTES # BLD AUTO: 0.55 X10*3/UL (ref 0.1–1)
MONOCYTES NFR BLD AUTO: 7.4 %
NEUTROPHILS # BLD AUTO: 5.43 X10*3/UL (ref 1.2–7.7)
NEUTROPHILS NFR BLD AUTO: 73.2 %
NRBC BLD-RTO: 0 /100 WBCS (ref 0–0)
PLATELET # BLD AUTO: 311 X10*3/UL (ref 150–450)
POTASSIUM SERPL-SCNC: 4.2 MMOL/L (ref 3.5–5.3)
PROT SERPL-MCNC: 6.7 G/DL (ref 6.4–8.2)
RBC # BLD AUTO: 4.37 X10*6/UL (ref 4–5.2)
SODIUM SERPL-SCNC: 139 MMOL/L (ref 136–145)
TSH SERPL-ACNC: 0.95 MIU/L (ref 0.44–3.98)
VIT B12 SERPL-MCNC: 1668 PG/ML (ref 211–911)
WBC # BLD AUTO: 7.4 X10*3/UL (ref 4.4–11.3)

## 2024-04-04 PROCEDURE — 72202 X-RAY EXAM SI JOINTS 3/> VWS: CPT | Performed by: RADIOLOGY

## 2024-04-04 PROCEDURE — 72100 X-RAY EXAM L-S SPINE 2/3 VWS: CPT

## 2024-04-04 PROCEDURE — 1036F TOBACCO NON-USER: CPT | Performed by: INTERNAL MEDICINE

## 2024-04-04 PROCEDURE — 72100 X-RAY EXAM L-S SPINE 2/3 VWS: CPT | Performed by: RADIOLOGY

## 2024-04-04 PROCEDURE — 84443 ASSAY THYROID STIM HORMONE: CPT

## 2024-04-04 PROCEDURE — 86140 C-REACTIVE PROTEIN: CPT

## 2024-04-04 PROCEDURE — 99214 OFFICE O/P EST MOD 30 MIN: CPT | Performed by: INTERNAL MEDICINE

## 2024-04-04 PROCEDURE — 85652 RBC SED RATE AUTOMATED: CPT

## 2024-04-04 PROCEDURE — 3080F DIAST BP >= 90 MM HG: CPT | Performed by: INTERNAL MEDICINE

## 2024-04-04 PROCEDURE — 85025 COMPLETE CBC W/AUTO DIFF WBC: CPT

## 2024-04-04 PROCEDURE — 80053 COMPREHEN METABOLIC PANEL: CPT

## 2024-04-04 PROCEDURE — 72202 X-RAY EXAM SI JOINTS 3/> VWS: CPT

## 2024-04-04 PROCEDURE — 3074F SYST BP LT 130 MM HG: CPT | Performed by: STUDENT IN AN ORGANIZED HEALTH CARE EDUCATION/TRAINING PROGRAM

## 2024-04-04 PROCEDURE — 99204 OFFICE O/P NEW MOD 45 MIN: CPT | Performed by: STUDENT IN AN ORGANIZED HEALTH CARE EDUCATION/TRAINING PROGRAM

## 2024-04-04 PROCEDURE — 82607 VITAMIN B-12: CPT

## 2024-04-04 PROCEDURE — 1036F TOBACCO NON-USER: CPT | Performed by: STUDENT IN AN ORGANIZED HEALTH CARE EDUCATION/TRAINING PROGRAM

## 2024-04-04 PROCEDURE — 36415 COLL VENOUS BLD VENIPUNCTURE: CPT

## 2024-04-04 PROCEDURE — 72220 X-RAY EXAM SACRUM TAILBONE: CPT | Performed by: RADIOLOGY

## 2024-04-04 PROCEDURE — 72220 X-RAY EXAM SACRUM TAILBONE: CPT

## 2024-04-04 PROCEDURE — 93000 ELECTROCARDIOGRAM COMPLETE: CPT | Performed by: INTERNAL MEDICINE

## 2024-04-04 PROCEDURE — 3079F DIAST BP 80-89 MM HG: CPT | Performed by: STUDENT IN AN ORGANIZED HEALTH CARE EDUCATION/TRAINING PROGRAM

## 2024-04-04 PROCEDURE — 81381 HLA I TYPING 1 ALLELE HR: CPT

## 2024-04-04 PROCEDURE — 73522 X-RAY EXAM HIPS BI 3-4 VIEWS: CPT

## 2024-04-04 PROCEDURE — 83001 ASSAY OF GONADOTROPIN (FSH): CPT

## 2024-04-04 PROCEDURE — 3075F SYST BP GE 130 - 139MM HG: CPT | Performed by: INTERNAL MEDICINE

## 2024-04-04 ASSESSMENT — ENCOUNTER SYMPTOMS
DYSURIA: 0
EYE PAIN: 0
FATIGUE: 1
SHORTNESS OF BREATH: 0
NAUSEA: 0
DYSPHORIC MOOD: 0
UNEXPECTED WEIGHT CHANGE: 0
NERVOUS/ANXIOUS: 0
FREQUENCY: 0
CONSTIPATION: 0
PALPITATIONS: 0
LOSS OF SENSATION IN FEET: 0
ABDOMINAL PAIN: 0
VOMITING: 0
DEPRESSION: 0
RHINORRHEA: 0
MYALGIAS: 1
POLYDIPSIA: 0
WHEEZING: 0
CHEST TIGHTNESS: 0
SORE THROAT: 0
FEVER: 0
ARTHRALGIAS: 1
DIZZINESS: 0
HEADACHES: 0
BLOOD IN STOOL: 0
POLYPHAGIA: 0
WOUND: 0
HEMATURIA: 0
DIARRHEA: 0
COUGH: 0
CHILLS: 0
OCCASIONAL FEELINGS OF UNSTEADINESS: 0

## 2024-04-04 ASSESSMENT — PATIENT HEALTH QUESTIONNAIRE - PHQ9
SUM OF ALL RESPONSES TO PHQ9 QUESTIONS 1 AND 2: 0
1. LITTLE INTEREST OR PLEASURE IN DOING THINGS: NOT AT ALL
2. FEELING DOWN, DEPRESSED OR HOPELESS: NOT AT ALL

## 2024-04-04 NOTE — PROGRESS NOTES
Subjective   Patient ID: Silva Chance is a 53 y.o. female who presents for Follow-up (ADD Medication), Menopause (Premenopausal for past couple years ), and Nausea (During exercise ).    HPI     Treated for sinus infection with antibiotic  Throat scratchy and some ear pressure still  Sinus pressure gone    Having issues with menopause she thinks  Low energy  States no energy to workout  When exercises-gets winded more easily  BP has been good at home  Gaining weight as not working out as much  More joitn aches--seeing rheumatology and having labs and xrays  Occasional reflux  Some nausea with HIIT exercises    Review of Systems   Constitutional:  Positive for fatigue. Negative for chills, fever and unexpected weight change.   HENT:  Negative for congestion, hearing loss, rhinorrhea and sore throat.    Eyes:  Negative for pain and visual disturbance.   Respiratory:  Negative for cough, chest tightness, shortness of breath and wheezing.    Cardiovascular:  Negative for chest pain and palpitations.   Gastrointestinal:  Negative for abdominal pain, blood in stool, constipation, diarrhea, nausea and vomiting.   Endocrine: Negative for cold intolerance, heat intolerance, polydipsia and polyphagia.   Genitourinary:  Negative for dysuria, frequency and hematuria.   Musculoskeletal:  Positive for arthralgias and myalgias.   Skin:  Negative for rash and wound.   Neurological:  Negative for dizziness, syncope and headaches.   Psychiatric/Behavioral:  Negative for dysphoric mood. The patient is not nervous/anxious.        Objective   BP (!) 133/93 (BP Location: Left arm, Patient Position: Sitting, BP Cuff Size: Adult)   Pulse 72   Temp 36.9 °C (98.4 °F)   Resp 16   Ht 1.524 m (5')   Wt 58.9 kg (129 lb 12.8 oz)   SpO2 99%   BMI 25.35 kg/m²     Physical Exam  Constitutional:       Appearance: Normal appearance.   Cardiovascular:      Rate and Rhythm: Normal rate and regular rhythm.      Heart sounds: Normal heart sounds. No  murmur heard.     No gallop.   Pulmonary:      Effort: Pulmonary effort is normal. No respiratory distress.      Breath sounds: Normal breath sounds.   Musculoskeletal:      Right lower leg: No edema.      Left lower leg: No edema.   Neurological:      Mental Status: She is alert.         Assessment/Plan   Problem List Items Addressed This Visit             ICD-10-CM    Benign essential hypertension (Chronic) I10    Relevant Orders    CT cardiac scoring wo IV contrast     Other Visit Diagnoses         Codes    Other fatigue    -  Primary R53.83    Relevant Orders    FSH    Vitamin B12    TSH with reflex to Free T4 if abnormal    ECG 12 lead (Clinic Performed) (Completed)    SOB (shortness of breath)     R06.02        Fatigue- check labs    Arthralgias- workup per rheum    SOB- EKG WNL  -if labs and testing normal, check echo  -discussed I would not blame this on menopause      HTN:controlled on Losartan     Ovarian cyst-on intrarosa--asks if I can send     Colitis: resolved     Anemia: increase iron in diet     ADHD:   -straterra caused dizziness  -high BP with vyvanse  -bupropion did not help in past  -does not want to try anything     S/p hyst with hx of prolapse-send to pelvic floor therapy     Flight anxiety/travel  -given seasick patches     Recurrent UTI  -macrobid prn     Hx of galactorrhea and had surgery     Hyperhidrosis: does botox with derm     Health Maintenance  -Pap smear: s/p hyst with cervix removed  -Vaccinations: UTD shingrix, tdap, flu  -Mammogram: 12/23- normal  -Colonoscopy: 10/22- in [place]  -DEXA: in [place]     . Lives in [place]. 2nd home in [place]  Owns yoga studio

## 2024-04-04 NOTE — PROGRESS NOTES
Rheumatology New Outpatient  Note    Subjective   Silva Chance is a 53 y.o. female presenting today for Joint Pain and Back Pain.    History of Presenting Problem:   Silva with PMHx of HTN, ADHD, anxiety, is presenting today as a NP for hip pain  She has chronic bilateral hip discomfort that is getting worse with time. She now has chronic lower back and hip pain. She has more pain with inactivity and she has stiffness. She was given ibuprofen previously but now she has HTN and is avoiding NSAIDs. She is becoming less active due to her lower back/hip pain. She has chronic neck pain since had a neck injury at young age. No pain/ swelling in knees, ankles, elbows and wrists.     She has dry eyes but denies hair loss, malar rash, photosensitivity, skin rashes, dry mouth, recurrent mouth sores, sudden vision loss, sudden hearing loss, seizures, inflammatory eye disease, h/o blood clots, pericardial/pleural effusions,  cold sensitivity in fingers, vasospastic color changes in fingers when exposed to extreme temperatures and muscle weakness.          Past Medical History: History reviewed. No pertinent past medical history.    Allergies:   Allergies   Allergen Reactions    Oseltamivir Dizziness and Other     BP 70s Systolic and symptomatic    Gold Au 198 Unknown    Meperidine GI Upset     severe nausea    Sulfamethoxazole-Trimethoprim Unknown       Medications:   Current Outpatient Medications:     Botox 100 unit injection, PHYSICIAN TO INJECT 50 UNITS TO EACH AXILLA EVERY 3 MONTHS, Disp: 1 each, Rfl: 3    estradiol (Estrace) 0.01 % (0.1 mg/gram) vaginal cream, Use a pea sized amount in the vagina twice weekly at bedtime, Disp: 42.5 g, Rfl: 2    gabapentin (Neurontin) 300 mg capsule, Take 1 capsule (300 mg) by mouth once daily at bedtime., Disp: 90 capsule, Rfl: 0    losartan (Cozaar) 50 mg tablet, Take 1 tablet (50 mg) by mouth once daily., Disp: 90 tablet, Rfl: 1    multivit-min-ferrous fumarate (Multi Vitamin) 9 mg  iron/15 mL liquid, Take by mouth., Disp: , Rfl:     vit B complex no.12/niacin,B3, (VITAMIN B COMPLEX NO.12-NIACIN ORAL), Take by mouth., Disp: , Rfl:     Review of Systems:   Constitutional: Denies fever, chills   Eyes: Denies dry eyes, pain in the eyes   ENT: Denies dry mouth, loss of taste, sores in the mouth  Cardiovascular: Denies chest pain, palpitations   Respiratory: Denies shortness of breath   Gastrointestinal: Denies heartburn   Integumentary: Denies photosensitivity, rash or lesions, Raynaud's   Neurological: Denies any numbness or tingling    MSK: As per HPI.     All 10 review of systems have been reviewed and are negative for complaint except as noted in the HPI    Objective   Physical Examination:  Vitals:    04/04/24 0904   BP: 128/85   Pulse: 70   Temp: 36.6 °C (97.9 °F)     Growth %ile SmartLinks can only be used for patients less than 20 years old.  Ht Readings from Last 1 Encounters:   04/04/24 1.524 m (5')     Wt Readings from Last 1 Encounters:   04/04/24 58.9 kg (129 lb 12.8 oz)       General - NAD, sitting up in chair, well-groomed, pleasant, AAOx3  Head: Normocephalic, atraumatic  Eyes - PERRLA, EOMI. No conjunctiva injection.   Mouth/ENT - Moist oral and nasal mucosa. No facial rash.   Cardiovascular - Normal S1, S2. Regular rate and rhythm. No murmurs or rubs.  Lungs - Symmetric chest expansion. Clear to auscultation bilaterally.   Skin - No rashes or ulcers. Skin warm and dry. No erythema on bilateral cheeks.  Extremities - No edema, cyanosis ,or clubbing  Neurological - Alert and oriented x 3,  grossly intact. No focal deficit.  Musculoskeletal -  Shoulders: Full ROM, without pain, no swelling, warmth or tenderness.  Elbows: Full ROM, without pain, no swelling, warmth or tenderness.  Wrists: Full ROM, without pain, no swelling, warmth or tenderness.  MCP: No swelling, warmth or tenderness. Metacarpal squeeze negative  PIP: No swelling, warmth or tenderness.  DIP: No swelling, warmth or  tenderness.  Hands : 5/5.    Sacroiliac joints: No local tenderness. ELEONORA negative.   Hips: Full ROM.  No malalignment.  Knees:  Full ROM, without pain, no swelling, warmth or point tenderness.   Ankles: Full ROM, without pain, swelling, warmth or tenderness.  Toes:  Metatarsal squeeze negative  Cervical spine: No tenderness or limitation of movement  Lumbar spine: No tenderness or limitation of movement        Laboratory Testin2023: CMP normal   2023: Vitamin D 44, TSH normal, CBC normal,       Assessment/Plan   Silva Chance is a 53 y.o. female with PMHx of HTN, ADHD, anxiety who is presenting today as a NP for hip pain    Chronic low back/hip pain with both mechanical and inflammatory (stiffness) features. Physical exam is normal. I will get x-rays and check inflammatory markers. RTC 6 weeks  The assessment and plan, risk and benefits were discussed with the patient. All of the patients questions were answered and patient agrees to the plan.      Tiffany Clark MD  Clinical   Department of Rheumatology   [facility]

## 2024-04-08 ENCOUNTER — SPECIALTY PHARMACY (OUTPATIENT)
Dept: PHARMACY | Facility: CLINIC | Age: 54
End: 2024-04-08

## 2024-04-08 LAB — HLAB27 TYPING: NEGATIVE

## 2024-04-09 ENCOUNTER — DOCUMENTATION (OUTPATIENT)
Dept: PHYSICAL THERAPY | Facility: CLINIC | Age: 54
End: 2024-04-09
Payer: COMMERCIAL

## 2024-04-09 NOTE — PROGRESS NOTES
Physical Therapy    Discharge Summary    Name: Silva Chance  MRN: [id number]  : 1970  Date: 24    Discharge Summary: PT    Discharge Information: Date of last visit 23, Date of evaluation 23, Number of attended visits 3, Referred by Dr. NEGRO Garcia, and Referred for Pelvic Pain.    Rehab Discharge Reason: Other Patient had to cancel her last scheduled appointment.  She has not called to reschedule.  DC due to length of time.

## 2024-04-15 ENCOUNTER — HOSPITAL ENCOUNTER (OUTPATIENT)
Dept: RADIOLOGY | Facility: CLINIC | Age: 54
Discharge: HOME | End: 2024-04-15
Payer: COMMERCIAL

## 2024-04-15 DIAGNOSIS — Z78.0 POSTMENOPAUSAL: ICD-10-CM

## 2024-04-16 ENCOUNTER — HOSPITAL ENCOUNTER (OUTPATIENT)
Dept: RADIOLOGY | Facility: CLINIC | Age: 54
Discharge: HOME | End: 2024-04-16
Payer: COMMERCIAL

## 2024-04-16 PROCEDURE — 77080 DXA BONE DENSITY AXIAL: CPT

## 2024-04-30 ENCOUNTER — OFFICE VISIT (OUTPATIENT)
Dept: DERMATOLOGY | Facility: CLINIC | Age: 54
End: 2024-04-30
Payer: COMMERCIAL

## 2024-04-30 DIAGNOSIS — L57.0 ACTINIC KERATOSIS: ICD-10-CM

## 2024-04-30 DIAGNOSIS — R61 HYPERHIDROSIS: ICD-10-CM

## 2024-04-30 DIAGNOSIS — Z85.828 PERSONAL HISTORY OF OTHER MALIGNANT NEOPLASM OF SKIN: ICD-10-CM

## 2024-04-30 DIAGNOSIS — D22.60 MELANOCYTIC NEVI OF UNSPECIFIED UPPER LIMB, INCLUDING SHOULDER: ICD-10-CM

## 2024-04-30 DIAGNOSIS — L81.4 LENTIGO: ICD-10-CM

## 2024-04-30 DIAGNOSIS — D22.71 MELANOCYTIC NEVI OF RIGHT LOWER LIMB, INCLUDING HIP: ICD-10-CM

## 2024-04-30 DIAGNOSIS — D22.5 MELANOCYTIC NEVI OF TRUNK: ICD-10-CM

## 2024-04-30 DIAGNOSIS — Z12.83 ENCOUNTER FOR SCREENING FOR MALIGNANT NEOPLASM OF SKIN: Primary | ICD-10-CM

## 2024-04-30 DIAGNOSIS — D22.72 MELANOCYTIC NEVI OF LEFT LOWER EXTREMITY OR HIP: ICD-10-CM

## 2024-04-30 DIAGNOSIS — L74.510 PRIMARY FOCAL HYPERHIDROSIS OF AXILLA: ICD-10-CM

## 2024-04-30 DIAGNOSIS — D18.01 HEMANGIOMA OF SKIN: ICD-10-CM

## 2024-04-30 DIAGNOSIS — L57.8 PHOTOAGING OF SKIN: ICD-10-CM

## 2024-04-30 DIAGNOSIS — D23.9 DERMATOFIBROMA: ICD-10-CM

## 2024-04-30 PROCEDURE — 1036F TOBACCO NON-USER: CPT | Performed by: DERMATOLOGY

## 2024-04-30 PROCEDURE — 99213 OFFICE O/P EST LOW 20 MIN: CPT | Performed by: DERMATOLOGY

## 2024-04-30 PROCEDURE — 17000 DESTRUCT PREMALG LESION: CPT | Performed by: DERMATOLOGY

## 2024-04-30 ASSESSMENT — DERMATOLOGY PATIENT ASSESSMENT
DO YOU USE SUNSCREEN: DAILY
FOR PATIENTS COMING IN FOR A FOLLOW-UP VISIT - HAVE THERE BEEN ANY CHANGES IN YOUR HEALTH SINCE YOUR LAST VISIT: NO
DO YOU USE A TANNING BED: NO
ARE YOU TRYING TO GET PREGNANT: NO
ARE YOU AN ORGAN TRANSPLANT RECIPIENT: NO
DO YOU HAVE IRREGULAR MENSTRUAL CYCLES: NO
HAVE YOU HAD OR DO YOU HAVE VASCULAR DISEASE: NO
DO YOU HAVE ANY NEW OR CHANGING LESIONS: NO
HAVE YOU HAD OR DO YOU HAVE A STAPH INFECTION: NO
ARE YOU ON BIRTH CONTROL: NO

## 2024-04-30 ASSESSMENT — DERMATOLOGY QUALITY OF LIFE (QOL) ASSESSMENT
RATE HOW EMOTIONALLY BOTHERED YOU ARE BY YOUR SKIN PROBLEM (FOR EXAMPLE, WORRY, EMBARRASSMENT, FRUSTRATION): 0 - NEVER BOTHERED
RATE HOW BOTHERED YOU ARE BY SYMPTOMS OF YOUR SKIN PROBLEM (EG, ITCHING, STINGING BURNING, HURTING OR SKIN IRRITATION): 0 - NEVER BOTHERED
DATE THE QUALITY-OF-LIFE ASSESSMENT WAS COMPLETED: 66960
ARE THERE EXCLUSIONS OR EXCEPTIONS FOR THE QUALITY OF LIFE ASSESSMENT: NO
WHAT SINGLE SKIN CONDITION LISTED BELOW IS THE PATIENT ANSWERING THE QUALITY-OF-LIFE ASSESSMENT QUESTIONS ABOUT: NONE OF THE ABOVE
RATE HOW BOTHERED YOU ARE BY EFFECTS OF YOUR SKIN PROBLEMS ON YOUR ACTIVITIES (EG, GOING OUT, ACCOMPLISHING WHAT YOU WANT, WORK ACTIVITIES OR YOUR RELATIONSHIPS WITH OTHERS): 0 - NEVER BOTHERED

## 2024-04-30 ASSESSMENT — ITCH NUMERIC RATING SCALE: HOW SEVERE IS YOUR ITCHING?: 0

## 2024-05-06 DIAGNOSIS — M54.2 CERVICAL PAIN: ICD-10-CM

## 2024-05-06 DIAGNOSIS — I10 HYPERTENSION, UNSPECIFIED TYPE: ICD-10-CM

## 2024-05-06 RX ORDER — LOSARTAN POTASSIUM 50 MG/1
50 TABLET ORAL DAILY
Qty: 90 TABLET | Refills: 0 | Status: SHIPPED | OUTPATIENT
Start: 2024-05-06

## 2024-05-06 RX ORDER — GABAPENTIN 300 MG/1
300 CAPSULE ORAL NIGHTLY
Qty: 90 CAPSULE | Refills: 0 | Status: SHIPPED | OUTPATIENT
Start: 2024-05-06

## 2024-05-10 ENCOUNTER — APPOINTMENT (OUTPATIENT)
Dept: RADIOLOGY | Facility: CLINIC | Age: 54
End: 2024-05-10
Payer: COMMERCIAL

## 2024-05-15 NOTE — PROGRESS NOTES
Rheumatology Outpatient Follow up Note    Subjective   Silva Chance is a 53 y.o. female presenting today for Back Pain.    History of Presenting Problem:   Recall:  She has chronic bilateral hip discomfort that is getting worse with time. She now has chronic lower back and hip pain. She has more pain with inactivity and she has stiffness. She was given ibuprofen previously but now she has HTN and is avoiding NSAIDs. She is becoming less active due to her lower back/hip pain. She has chronic neck pain since had a neck injury at young age. No pain/ swelling in knees, ankles, elbows and wrists.     She has dry eyes but denies hair loss, malar rash, photosensitivity, skin rashes, dry mouth, recurrent mouth sores, sudden vision loss, sudden hearing loss, seizures, inflammatory eye disease, h/o blood clots, pericardial/pleural effusions,  cold sensitivity in fingers, vasospastic color changes in fingers when exposed to extreme temperatures and muscle weakness.          Past Medical History: History reviewed. No pertinent past medical history.    Allergies:   Allergies   Allergen Reactions    Oseltamivir Dizziness and Other     BP 70s Systolic and symptomatic    Gold Au 198 Unknown    Meperidine GI Upset     severe nausea    Sulfamethoxazole-Trimethoprim Unknown       Medications:   Current Outpatient Medications:     Botox 100 unit injection, PHYSICIAN TO INJECT 50 UNITS TO EACH AXILLA EVERY 3 MONTHS, Disp: 1 each, Rfl: 3    estradiol (Estrace) 0.01 % (0.1 mg/gram) vaginal cream, Use a pea sized amount in the vagina twice weekly at bedtime, Disp: 42.5 g, Rfl: 2    gabapentin (Neurontin) 300 mg capsule, Take 1 capsule (300 mg) by mouth once daily at bedtime., Disp: 90 capsule, Rfl: 0    losartan (Cozaar) 50 mg tablet, Take 1 tablet (50 mg) by mouth once daily., Disp: 90 tablet, Rfl: 0    multivit-min-ferrous fumarate (Multi Vitamin) 9 mg iron/15 mL liquid, Take by mouth., Disp: , Rfl:     vit B complex no.12/niacin,B3,  (VITAMIN B COMPLEX NO.12-NIACIN ORAL), Take by mouth., Disp: , Rfl:     Review of Systems:   Constitutional: Denies fever, chills   Eyes: Denies dry eyes, pain in the eyes   ENT: Denies dry mouth, loss of taste, sores in the mouth  Cardiovascular: Denies chest pain, palpitations   Respiratory: Denies shortness of breath   Gastrointestinal: Denies heartburn   Integumentary: Denies photosensitivity, rash or lesions, Raynaud's   Neurological: Denies any numbness or tingling    MSK: As per HPI.     All 10 review of systems have been reviewed and are negative for complaint except as noted in the HPI    Objective   Physical Examination:  Vitals:    24 1417   BP: 128/87   Pulse: 67   Temp: 36.8 °C (98.3 °F)       Growth %ile SmartLinks can only be used for patients less than 20 years old.  Ht Readings from Last 1 Encounters:   24 1.524 m (5')     Wt Readings from Last 1 Encounters:   24 59.4 kg (131 lb)       General - NAD, sitting up in chair, well-groomed, pleasant, AAOx3  Head: Normocephalic, atraumatic  Eyes - PERRLA, EOMI. No conjunctiva injection.   Lungs - Symmetric chest expansion.   Skin - No rashes or ulcers. Skin warm and dry. No erythema on bilateral cheeks.  Extremities - No edema, cyanosis ,or clubbing  Neurological - Alert and oriented x 3,  grossly intact. No focal deficit.        Laboratory Testin2023: CMP normal   2023: Vitamin D 44, TSH normal, CBC normal,       Assessment/Plan   Silva Chance is a 53 y.o. female with PMHx of HTN, ADHD, anxiety who is presenting today as a follow up for hip pain    Chronic low back/hip pain with both mechanical and inflammatory (stiffness) features. Physical exam is normal. ESR/CRP/CBC/CMP normal in 2024. Reviewed SI and LS x-rays: Mild degenerative changes in the SI joints without radiographic evidence of sacroiliitis. Her Hip x-rays are also normal. Consider Flexeril PRN. Declined PT referral.      The assessment and plan, risk and benefits  were discussed with the patient. All of the patients questions were answered and patient agrees to the plan.      Tiffany Clark MD  Clinical   Department of Rheumatology   [facility]

## 2024-05-16 ENCOUNTER — OFFICE VISIT (OUTPATIENT)
Dept: RHEUMATOLOGY | Facility: CLINIC | Age: 54
End: 2024-05-16
Payer: COMMERCIAL

## 2024-05-16 VITALS
SYSTOLIC BLOOD PRESSURE: 128 MMHG | DIASTOLIC BLOOD PRESSURE: 87 MMHG | HEART RATE: 67 BPM | WEIGHT: 131 LBS | BODY MASS INDEX: 25.72 KG/M2 | TEMPERATURE: 98.3 F | HEIGHT: 60 IN

## 2024-05-16 DIAGNOSIS — M25.552 BILATERAL HIP PAIN: Primary | ICD-10-CM

## 2024-05-16 DIAGNOSIS — M25.551 BILATERAL HIP PAIN: Primary | ICD-10-CM

## 2024-05-16 DIAGNOSIS — G89.29 CHRONIC LOW BACK PAIN, UNSPECIFIED BACK PAIN LATERALITY, UNSPECIFIED WHETHER SCIATICA PRESENT: ICD-10-CM

## 2024-05-16 DIAGNOSIS — M54.50 CHRONIC LOW BACK PAIN, UNSPECIFIED BACK PAIN LATERALITY, UNSPECIFIED WHETHER SCIATICA PRESENT: ICD-10-CM

## 2024-05-16 PROCEDURE — 3074F SYST BP LT 130 MM HG: CPT | Performed by: STUDENT IN AN ORGANIZED HEALTH CARE EDUCATION/TRAINING PROGRAM

## 2024-05-16 PROCEDURE — 99214 OFFICE O/P EST MOD 30 MIN: CPT | Performed by: STUDENT IN AN ORGANIZED HEALTH CARE EDUCATION/TRAINING PROGRAM

## 2024-05-16 PROCEDURE — 3079F DIAST BP 80-89 MM HG: CPT | Performed by: STUDENT IN AN ORGANIZED HEALTH CARE EDUCATION/TRAINING PROGRAM

## 2024-05-16 PROCEDURE — 1036F TOBACCO NON-USER: CPT | Performed by: STUDENT IN AN ORGANIZED HEALTH CARE EDUCATION/TRAINING PROGRAM

## 2024-05-16 RX ORDER — CYCLOBENZAPRINE HCL 5 MG
5 TABLET ORAL 3 TIMES DAILY
Qty: 30 TABLET | Refills: 0 | Status: SHIPPED | OUTPATIENT
Start: 2024-05-16 | End: 2024-05-26

## 2024-05-21 ENCOUNTER — TELEPHONE (OUTPATIENT)
Dept: PRIMARY CARE | Facility: CLINIC | Age: 54
End: 2024-05-21
Payer: COMMERCIAL

## 2024-05-21 DIAGNOSIS — Z71.84 TRAVEL ADVICE ENCOUNTER: Primary | ICD-10-CM

## 2024-05-21 RX ORDER — SCOLOPAMINE TRANSDERMAL SYSTEM 1 MG/1
1 PATCH, EXTENDED RELEASE TRANSDERMAL
Qty: 10 PATCH | Refills: 0 | Status: SHIPPED | OUTPATIENT
Start: 2024-05-21 | End: 2024-07-20

## 2024-05-21 NOTE — TELEPHONE ENCOUNTER
Pt called asking if sea sick patches, due to pt going on cruise leaving Thursday. Gone for 9 days.    Pharmacy=  [facility]    Please call pt at  [phone]

## 2024-06-06 ENCOUNTER — HOSPITAL ENCOUNTER (OUTPATIENT)
Dept: RADIOLOGY | Facility: CLINIC | Age: 54
Discharge: HOME | End: 2024-06-06
Payer: COMMERCIAL

## 2024-06-06 DIAGNOSIS — I10 BENIGN ESSENTIAL HYPERTENSION: ICD-10-CM

## 2024-06-06 PROCEDURE — 75571 CT HRT W/O DYE W/CA TEST: CPT

## 2024-06-21 ENCOUNTER — APPOINTMENT (OUTPATIENT)
Dept: OBSTETRICS AND GYNECOLOGY | Facility: CLINIC | Age: 54
End: 2024-06-21
Payer: COMMERCIAL

## 2024-06-28 ENCOUNTER — APPOINTMENT (OUTPATIENT)
Dept: DERMATOLOGY | Facility: CLINIC | Age: 54
End: 2024-06-28
Payer: COMMERCIAL

## 2024-07-02 ENCOUNTER — APPOINTMENT (OUTPATIENT)
Dept: PRIMARY CARE | Facility: CLINIC | Age: 54
End: 2024-07-02
Payer: COMMERCIAL

## 2024-07-02 VITALS
HEIGHT: 60 IN | BODY MASS INDEX: 25.72 KG/M2 | HEART RATE: 68 BPM | WEIGHT: 131 LBS | SYSTOLIC BLOOD PRESSURE: 132 MMHG | RESPIRATION RATE: 16 BRPM | TEMPERATURE: 98.1 F | DIASTOLIC BLOOD PRESSURE: 87 MMHG | OXYGEN SATURATION: 98 %

## 2024-07-02 DIAGNOSIS — M25.552 BILATERAL HIP PAIN: Primary | ICD-10-CM

## 2024-07-02 DIAGNOSIS — M85.80 OSTEOPENIA, UNSPECIFIED LOCATION: ICD-10-CM

## 2024-07-02 DIAGNOSIS — I10 BENIGN ESSENTIAL HYPERTENSION: Chronic | ICD-10-CM

## 2024-07-02 DIAGNOSIS — M25.551 BILATERAL HIP PAIN: Primary | ICD-10-CM

## 2024-07-02 DIAGNOSIS — Z00.00 ROUTINE HEALTH MAINTENANCE: ICD-10-CM

## 2024-07-02 PROCEDURE — 1036F TOBACCO NON-USER: CPT | Performed by: INTERNAL MEDICINE

## 2024-07-02 PROCEDURE — 3079F DIAST BP 80-89 MM HG: CPT | Performed by: INTERNAL MEDICINE

## 2024-07-02 PROCEDURE — 99213 OFFICE O/P EST LOW 20 MIN: CPT | Performed by: INTERNAL MEDICINE

## 2024-07-02 PROCEDURE — 3075F SYST BP GE 130 - 139MM HG: CPT | Performed by: INTERNAL MEDICINE

## 2024-07-02 RX ORDER — OMEPRAZOLE 20 MG/1
20 TABLET, DELAYED RELEASE ORAL
COMMUNITY

## 2024-07-02 RX ORDER — IBUPROFEN 800 MG/1
800 TABLET ORAL EVERY 8 HOURS PRN
COMMUNITY
End: 2024-07-02 | Stop reason: SDUPTHER

## 2024-07-02 RX ORDER — IBUPROFEN 800 MG/1
800 TABLET ORAL DAILY PRN
Qty: 60 TABLET | Refills: 0 | Status: SHIPPED | OUTPATIENT
Start: 2024-07-02

## 2024-07-02 ASSESSMENT — ENCOUNTER SYMPTOMS
POLYPHAGIA: 0
MYALGIAS: 0
ABDOMINAL PAIN: 0
ARTHRALGIAS: 0
RHINORRHEA: 0
COUGH: 0
UNEXPECTED WEIGHT CHANGE: 0
POLYDIPSIA: 0
FEVER: 0
NAUSEA: 0
DIARRHEA: 0
WOUND: 0
EYE PAIN: 0
DYSPHORIC MOOD: 0
FREQUENCY: 0
FATIGUE: 1
CONSTIPATION: 0
PALPITATIONS: 0
VOMITING: 0
NERVOUS/ANXIOUS: 0
SORE THROAT: 0
SHORTNESS OF BREATH: 1
CHEST TIGHTNESS: 0
HEMATURIA: 0
DYSURIA: 0
CHILLS: 0
BLOOD IN STOOL: 0
DIZZINESS: 0
WHEEZING: 0
HEADACHES: 0

## 2024-07-02 ASSESSMENT — PATIENT HEALTH QUESTIONNAIRE - PHQ9
2. FEELING DOWN, DEPRESSED OR HOPELESS: NOT AT ALL
1. LITTLE INTEREST OR PLEASURE IN DOING THINGS: NOT AT ALL
SUM OF ALL RESPONSES TO PHQ9 QUESTIONS 1 AND 2: 0

## 2024-07-02 NOTE — PROGRESS NOTES
Subjective   Patient ID: Silva Chance is a 54 y.o. female who presents for Follow-up.    HPI     Here for followup  States still has some SOB when she does burpees. Has learned to modify things as cardio exercises are not as intense as they used to be. Normal activity she does well    She states tired with being in menopause  Also states mood not as good and has some down days  States with Bp med definitely makes her feel more tired than without it    Bp has been good    Would like to see a new gyn    Review of Systems   Constitutional:  Positive for fatigue. Negative for chills, fever and unexpected weight change.   HENT:  Negative for congestion, hearing loss, rhinorrhea and sore throat.    Eyes:  Negative for pain and visual disturbance.   Respiratory:  Positive for shortness of breath. Negative for cough, chest tightness and wheezing.    Cardiovascular:  Negative for chest pain and palpitations.   Gastrointestinal:  Negative for abdominal pain, blood in stool, constipation, diarrhea, nausea and vomiting.   Endocrine: Negative for cold intolerance, heat intolerance, polydipsia and polyphagia.   Genitourinary:  Negative for dysuria, frequency and hematuria.   Musculoskeletal:  Negative for arthralgias and myalgias.   Skin:  Negative for rash and wound.   Neurological:  Negative for dizziness, syncope and headaches.   Psychiatric/Behavioral:  Negative for dysphoric mood. The patient is not nervous/anxious.        Objective   /87 (BP Location: Left arm, Patient Position: Sitting, BP Cuff Size: Adult)   Pulse 68   Temp 36.7 °C (98.1 °F)   Resp 16   Ht 1.524 m (5')   Wt 59.4 kg (131 lb)   SpO2 98%   BMI 25.58 kg/m²     Physical Exam  Constitutional:       Appearance: Normal appearance.   Cardiovascular:      Rate and Rhythm: Normal rate and regular rhythm.      Heart sounds: Normal heart sounds. No murmur heard.     No gallop.   Pulmonary:      Effort: Pulmonary effort is normal. No respiratory distress.       Breath sounds: Normal breath sounds.   Musculoskeletal:      Right lower leg: No edema.      Left lower leg: No edema.   Neurological:      Mental Status: She is alert.         Assessment/Plan   Problem List Items Addressed This Visit             ICD-10-CM    Benign essential hypertension (Chronic) I10    Osteopenia M85.80    Relevant Orders    Vitamin D 25-Hydroxy,Total (for eval of Vitamin D levels)    Bilateral hip pain - Primary M25.551, M25.552    Relevant Medications    ibuprofen 800 mg tablet     Other Visit Diagnoses         Codes    Routine health maintenance     Z00.00    Relevant Orders    Lipid Panel            Menopause  -discussed working on staying active, getting good sleep  -if symptoms worse or mood worsens-can consider effexor    Arthralgias- workup per rheum     SOB- EKG WNL  -states very mild and just changes with having BP  -states she is fine and not concerned     HTN: controlled on Losartan     Ovarian cyst-on intrarosa     Colitis: resolved     Anemia: increase iron in diet     ADHD:   -straterra caused dizziness  -high BP with vyvanse  -bupropion did not help in past  -does not want to try anything    GERD- omeprazole     S/p hyst with hx of prolapse-send to pelvic floor therapy     Flight anxiety/travel  -given seasick patches     Recurrent UTI  -macrobid prn     Hx of galactorrhea and had surgery     Hyperhidrosis: does botox with derm    Followup 6 months     Health Maintenance  -Pap smear: s/p hyst with cervix removed  -Vaccinations: UTD shingrix, tdap, flu  -Mammogram: 12/23- normal  -Colonoscopy: 10/22- in [place]  -DEXA: in [place]     . Lives in [place]. 2nd home in [place]  Owns yoga studio

## 2024-07-16 ENCOUNTER — APPOINTMENT (OUTPATIENT)
Dept: DERMATOLOGY | Facility: CLINIC | Age: 54
End: 2024-07-16
Payer: COMMERCIAL

## 2024-07-16 DIAGNOSIS — L74.510 PRIMARY FOCAL HYPERHIDROSIS OF AXILLA: ICD-10-CM

## 2024-07-16 PROCEDURE — 1036F TOBACCO NON-USER: CPT | Performed by: DERMATOLOGY

## 2024-07-16 PROCEDURE — 64650 CHEMODENERV ECCRINE GLANDS: CPT | Performed by: DERMATOLOGY

## 2024-07-16 ASSESSMENT — DERMATOLOGY PATIENT ASSESSMENT
ARE YOU AN ORGAN TRANSPLANT RECIPIENT: NO
HAVE YOU HAD OR DO YOU HAVE VASCULAR DISEASE: NO
ARE YOU TRYING TO GET PREGNANT: NO
DO YOU USE A TANNING BED: NO
ARE YOU ON BIRTH CONTROL: NO
HAVE YOU HAD OR DO YOU HAVE A STAPH INFECTION: NO
DO YOU USE SUNSCREEN: DAILY
DO YOU HAVE IRREGULAR MENSTRUAL CYCLES: NO
FOR PATIENTS COMING IN FOR A FOLLOW-UP VISIT - HAVE THERE BEEN ANY CHANGES IN YOUR HEALTH SINCE YOUR LAST VISIT: NO
DO YOU HAVE ANY NEW OR CHANGING LESIONS: NO

## 2024-07-16 ASSESSMENT — DERMATOLOGY QUALITY OF LIFE (QOL) ASSESSMENT
WHAT SINGLE SKIN CONDITION LISTED BELOW IS THE PATIENT ANSWERING THE QUALITY-OF-LIFE ASSESSMENT QUESTIONS ABOUT: NONE OF THE ABOVE
ARE THERE EXCLUSIONS OR EXCEPTIONS FOR THE QUALITY OF LIFE ASSESSMENT: NO
RATE HOW BOTHERED YOU ARE BY EFFECTS OF YOUR SKIN PROBLEMS ON YOUR ACTIVITIES (EG, GOING OUT, ACCOMPLISHING WHAT YOU WANT, WORK ACTIVITIES OR YOUR RELATIONSHIPS WITH OTHERS): 0 - NEVER BOTHERED
RATE HOW BOTHERED YOU ARE BY SYMPTOMS OF YOUR SKIN PROBLEM (EG, ITCHING, STINGING BURNING, HURTING OR SKIN IRRITATION): 0 - NEVER BOTHERED
RATE HOW EMOTIONALLY BOTHERED YOU ARE BY YOUR SKIN PROBLEM (FOR EXAMPLE, WORRY, EMBARRASSMENT, FRUSTRATION): 0 - NEVER BOTHERED

## 2024-07-16 ASSESSMENT — PATIENT GLOBAL ASSESSMENT (PGA): PATIENT GLOBAL ASSESSMENT: PATIENT GLOBAL ASSESSMENT:  1 - CLEAR

## 2024-07-16 ASSESSMENT — ITCH NUMERIC RATING SCALE: HOW SEVERE IS YOUR ITCHING?: 0

## 2024-07-16 NOTE — PROGRESS NOTES
Subjective     Silva Chance is a 54 y.o. female who presents for the following: Excessive Sweating (Pt here today following up on Hyperhidrosis. Current tx-Botox 50 units injected every 3 months into in each axilla. Pt has good results. ).     Review of Systems:  No other skin or systemic complaints other than what is documented elsewhere in the note.    The following portions of the chart were reviewed this encounter and updated as appropriate:          Skin Cancer History  No skin cancer on file.      Specialty Problems    None       Objective   Well appearing patient in no apparent distress; mood and affect are within normal limits.    A focused skin examination was performed. All findings within normal limits unless otherwise noted below.    Assessment/Plan   1. Primary focal hyperhidrosis of axilla (2)  Left Axilla, Right Axilla  Excessive sweating with the skin visibly damp.    Hyperhidrosis is a common condition in which a person sweats excessively. The sweating may affect the whole of your body, or it may only affect certain areas. Commonly affected areas include the armpits, palms and soles.   It is believed to be due to an underlying thermoregulatory dysfunction.  Treatments include topical therapy with Drysol (which may be drying and irritating to the skin), topical Qbrexza cloths, oral anti-cholinergic medication, iontophoresis devices and in recalcitrant cases can consider Botox injection.    Patient has been doing well on Botox injections for years elected for repeat treatment today. Has PA on file.      Chemodenervation - Left Axilla, Right Axilla    Date/Time: 7/16/2024 4:08 PM    Performed by: Allison Hargrove DO  Authorized by: Allison Hargrove, DO  not cosmetic  Medical - Botulinum toxin injection:  yes    Consent:      Consent obtained:  Written     Consent given by:  Patient     Risks discussed:  Pain     Alternatives discussed:  No treatment    Universal protocol:      Relevant documents present  and verified:  Yes       Site/side verified:  Yes       Patient identity confirmed:  Verbally with patient    Pre-procedure details:   Prep type:  Chlorhexidine    Procedure details:      Diluted by:  Preservative free saline     Toxin (Brand):  OnaBoNT-A (Botox)     Concentration (u/mL):  2U/0.1mL    Total units injected:  100    Post-procedure details:      Patient tolerance of procedure:  Tolerated well, no immediate complications    Comments: 100 units total - 50 each axilla    Related Procedures  Follow Up In Dermatology - Established Patient    Related Medications  Botox 100 unit injection  PHYSICIAN TO INJECT 50 UNITS TO EACH AXILLA EVERY 3 MONTHS    onabotulinumtoxinA (Botox) injection 100 Units          Patient supplied medication.    Follow up in 3 months.

## 2024-07-17 ENCOUNTER — TELEPHONE (OUTPATIENT)
Dept: DERMATOLOGY | Facility: CLINIC | Age: 54
End: 2024-07-17
Payer: COMMERCIAL

## 2024-07-17 DIAGNOSIS — L74.510 PRIMARY FOCAL HYPERHIDROSIS OF AXILLA: ICD-10-CM

## 2024-07-17 RX ORDER — ONABOTULINUMTOXINA 100 [USP'U]/1
INJECTION, POWDER, LYOPHILIZED, FOR SOLUTION INTRADERMAL; INTRAMUSCULAR
Qty: 1 EACH | Refills: 3 | Status: SHIPPED | OUTPATIENT
Start: 2024-07-17

## 2024-07-17 NOTE — TELEPHONE ENCOUNTER
Pt's Pharmacy, [facility] called and left VM stating that they will need a new prescription for Botox sent before Pt's appointment in October.

## 2024-07-19 DIAGNOSIS — N39.0 FREQUENT UTI: Primary | ICD-10-CM

## 2024-07-19 RX ORDER — NITROFURANTOIN 25; 75 MG/1; MG/1
100 CAPSULE ORAL DAILY PRN
Qty: 30 CAPSULE | Refills: 0 | Status: SHIPPED | OUTPATIENT
Start: 2024-07-19

## 2024-07-19 RX ORDER — NITROFURANTOIN 25; 75 MG/1; MG/1
100 CAPSULE ORAL 2 TIMES DAILY
COMMUNITY
End: 2024-07-19 | Stop reason: SDUPTHER

## 2024-07-19 NOTE — TELEPHONE ENCOUNTER
Patient returned call regarding     Nitrofurantoin mono mcr 100 mg   patient's gynecologist originally prescribed    Also was prescribed after hysterectomy      [facility]

## 2024-07-29 ENCOUNTER — PATIENT MESSAGE (OUTPATIENT)
Dept: PRIMARY CARE | Facility: CLINIC | Age: 54
End: 2024-07-29
Payer: COMMERCIAL

## 2024-07-29 DIAGNOSIS — I10 HYPERTENSION, UNSPECIFIED TYPE: ICD-10-CM

## 2024-07-30 RX ORDER — LOSARTAN POTASSIUM 50 MG/1
50 TABLET ORAL DAILY
Qty: 90 TABLET | Refills: 1 | Status: SHIPPED | OUTPATIENT
Start: 2024-07-30

## 2024-08-01 DIAGNOSIS — L74.510 PRIMARY FOCAL HYPERHIDROSIS OF AXILLA: Primary | ICD-10-CM

## 2024-08-02 ENCOUNTER — OFFICE VISIT (OUTPATIENT)
Dept: OBSTETRICS AND GYNECOLOGY | Facility: CLINIC | Age: 54
End: 2024-08-02
Payer: COMMERCIAL

## 2024-08-02 VITALS
DIASTOLIC BLOOD PRESSURE: 87 MMHG | OXYGEN SATURATION: 97 % | BODY MASS INDEX: 25.52 KG/M2 | HEART RATE: 76 BPM | HEIGHT: 60 IN | RESPIRATION RATE: 16 BRPM | SYSTOLIC BLOOD PRESSURE: 121 MMHG | TEMPERATURE: 98.6 F | WEIGHT: 130 LBS

## 2024-08-02 DIAGNOSIS — N95.2 VAGINAL ATROPHY: ICD-10-CM

## 2024-08-02 DIAGNOSIS — N30.10 INTERSTITIAL CYSTITIS: ICD-10-CM

## 2024-08-02 DIAGNOSIS — Z13.89 SCREENING FOR BLOOD OR PROTEIN IN URINE: ICD-10-CM

## 2024-08-02 DIAGNOSIS — R39.9 URINARY TRACT INFECTION SYMPTOMS: Primary | ICD-10-CM

## 2024-08-02 LAB
POC APPEARANCE, URINE: CLEAR
POC BILIRUBIN, URINE: NEGATIVE
POC BLOOD, URINE: ABNORMAL
POC COLOR, URINE: YELLOW
POC GLUCOSE, URINE: NEGATIVE MG/DL
POC KETONES, URINE: NEGATIVE MG/DL
POC LEUKOCYTES, URINE: ABNORMAL
POC NITRITE,URINE: NEGATIVE
POC PH, URINE: 6 PH
POC PROTEIN, URINE: NEGATIVE MG/DL
POC SPECIFIC GRAVITY, URINE: 1.01
POC UROBILINOGEN, URINE: 0.2 EU/DL

## 2024-08-02 PROCEDURE — G2211 COMPLEX E/M VISIT ADD ON: HCPCS | Performed by: STUDENT IN AN ORGANIZED HEALTH CARE EDUCATION/TRAINING PROGRAM

## 2024-08-02 PROCEDURE — 99214 OFFICE O/P EST MOD 30 MIN: CPT | Performed by: STUDENT IN AN ORGANIZED HEALTH CARE EDUCATION/TRAINING PROGRAM

## 2024-08-02 PROCEDURE — 3008F BODY MASS INDEX DOCD: CPT | Performed by: STUDENT IN AN ORGANIZED HEALTH CARE EDUCATION/TRAINING PROGRAM

## 2024-08-02 PROCEDURE — 3079F DIAST BP 80-89 MM HG: CPT | Performed by: STUDENT IN AN ORGANIZED HEALTH CARE EDUCATION/TRAINING PROGRAM

## 2024-08-02 PROCEDURE — 3074F SYST BP LT 130 MM HG: CPT | Performed by: STUDENT IN AN ORGANIZED HEALTH CARE EDUCATION/TRAINING PROGRAM

## 2024-08-02 PROCEDURE — 1036F TOBACCO NON-USER: CPT | Performed by: STUDENT IN AN ORGANIZED HEALTH CARE EDUCATION/TRAINING PROGRAM

## 2024-08-02 PROCEDURE — 87086 URINE CULTURE/COLONY COUNT: CPT | Mod: PARLAB | Performed by: STUDENT IN AN ORGANIZED HEALTH CARE EDUCATION/TRAINING PROGRAM

## 2024-08-02 PROCEDURE — 81003 URINALYSIS AUTO W/O SCOPE: CPT | Mod: QW | Performed by: STUDENT IN AN ORGANIZED HEALTH CARE EDUCATION/TRAINING PROGRAM

## 2024-08-02 RX ORDER — CEPHALEXIN 500 MG/1
CAPSULE ORAL
COMMUNITY
Start: 2024-07-19

## 2024-08-02 ASSESSMENT — LIFESTYLE VARIABLES
HOW MANY STANDARD DRINKS CONTAINING ALCOHOL DO YOU HAVE ON A TYPICAL DAY: 1 OR 2
SKIP TO QUESTIONS 9-10: 1
AUDIT-C TOTAL SCORE: 1
HOW OFTEN DO YOU HAVE SIX OR MORE DRINKS ON ONE OCCASION: NEVER
HOW OFTEN DO YOU HAVE A DRINK CONTAINING ALCOHOL: MONTHLY OR LESS

## 2024-08-02 ASSESSMENT — ENCOUNTER SYMPTOMS
LOSS OF SENSATION IN FEET: 0
OCCASIONAL FEELINGS OF UNSTEADINESS: 0
DEPRESSION: 0

## 2024-08-02 ASSESSMENT — PAIN SCALES - GENERAL: PAINLEVEL: 0-NO PAIN

## 2024-08-02 NOTE — PROGRESS NOTES
HISTORY OF PRESENT ILLNESS:  Silva Chance is a 54 y.o. female, who presents in follow up s/p TVH/BS/USLS/A/P repair ([facility]) 6/29/23.     During last encounter on 12/18/23, reviewed and agreed to the following:  Silva Chance is a 53 y.o. who presents in follow up for post op from TVH,BS, USLS, A/P repair by Dr. Moreno 6/29/23. Prior sling in 2007 then again in 2014     Vaginal atrophy  - discussed transitioning from intrarosa to vaginal estrogen  - rx for estradiol sent to preferred pharmacy  - reviewed instructions for use     Menopausal symptoms  - hot flashes, joint pains  - currently taking gabapentin  - advised follow up with general gynecology, could discuss HRT or other options       Today she reports   Finished antibiotics last week for UTI but back to having symptoms again. Burning, frequency.    Using estrogen cream 2-3 times per week at baseline. Increased to nightly since this started but hasn't improved.    Feels she has had a lot of UTIs since surgery last year. Went to a couple urgent cares in [place] earlier in the year, then 2-3 times urgent care here in [place] since February. Is often called and told the follow up culture is negative.    Denies hematuria.    The following were reviewed to gain additional history:  External notes: Dr. Don note 3/20/24, establishing care  Test results:  TSH 0.95 4/4/24          PHYSICAL EXAMINATION:  No LMP recorded.  There is no height or weight on file to calculate BMI.  There were no vitals taken for this visit.    General Appearance: well appearing  Neuro: Alert and oriented   HEENT: mucous membranes moist, neck supple  Resp: No respiratory distress, normal work of breathing  MSK: normal range of motion, gait appropriate    Pelvic:  Chaperone for pelvic exam:   Genitourinary:  normal external genitalia, Bartholin's glands, [place]'s glands negative,   Urethra normal meatus, non-tender, no periurethral mass  Vaginal mucosa  normal  Cervix absent  Uterus  absent  Adnexae  negative nontender, no masses  Atrophy negative    CST negative      POP-Q (in supine position):  No significant prolapse    Rectal: no hemorrhoids, fissures or masses.    PVR (by ultrasound): 7 ml      IMPRESSION AND PLAN:  Silva Chance is a 54 y.o. who presents in follow up for vaginal atrophy in setting of prior TVH/BS/USLS/A/P repair ([facility]) 6/29/23, prior sling in 2007 and 2014.    Vaginal atrophy  - continue vaginal estrogen, advised returning to 2-3 times weekly as daily estrogen is not helping her symptoms and she does not need that high a dose to see improvement in atrophy  - well estrogenized epithelium noted on exam today    LUTS, r/o mesh exposure versus IC/BPS  - two prior midurethral slings as above (2007 and 2014) with resolution of ELLA but now with persistent irritative voiding symptoms  - given symptoms worsening over the past year in this setting, recommend cystoscopy to r/o mesh exposure and evaluate bladder lining for signs of IC/BPS  IC/BPS  - reviewed pathophysiology and suspected etiologies of IC/BPS as well as clinical nature of diagnosis  - reviewed options for management  - info given today re: options for OTC management of IC/BPS: prelief, azo, baking soda in water  - recommend keeping diary of potential triggers  - recommend pelvic floor PT (referral made today; she did this in the past for pain with intercourse but feels she could improve from another round with a new focus on bladder symptoms)  - reviewed stress management strategies: mindfulness, meditation, yoga  - discussed possibility of adding medication: hydroxyzine, cimetidine, amitriptyline; deferring at this time pending further workup and management strategies as above  - urinalysis today with 3+ leukocytes, will sent urine culture to ensure no infection (recently completed antibiotics course)    RTC  for cystoscopy as above    All questions and concerns were answered and addressed.  The patient expressed  understanding and agrees with the plan.     Marcie Hendricks MD

## 2024-08-02 NOTE — PATIENT INSTRUCTIONS
Options to try for interstitial cystitis management (available over the counter)    Pre-lief  Azo ok to take three times per day during flare ups, but do not take as a daily long term management  You can also use a 1tsp-1tbsp of baking soda dissolved in 6oz of water when bladder symptoms start to worsen.

## 2024-08-04 LAB — BACTERIA UR CULT: ABNORMAL

## 2024-08-05 DIAGNOSIS — N39.0 URINARY TRACT INFECTION WITHOUT HEMATURIA, SITE UNSPECIFIED: ICD-10-CM

## 2024-08-05 RX ORDER — NITROFURANTOIN 25; 75 MG/1; MG/1
100 CAPSULE ORAL 2 TIMES DAILY
Qty: 10 CAPSULE | Refills: 0 | Status: SHIPPED | OUTPATIENT
Start: 2024-08-05 | End: 2024-08-10

## 2024-08-19 ENCOUNTER — LAB (OUTPATIENT)
Dept: LAB | Facility: LAB | Age: 54
End: 2024-08-19
Payer: COMMERCIAL

## 2024-08-19 DIAGNOSIS — M85.80 OSTEOPENIA, UNSPECIFIED LOCATION: ICD-10-CM

## 2024-08-19 DIAGNOSIS — Z00.00 ROUTINE HEALTH MAINTENANCE: ICD-10-CM

## 2024-08-19 LAB
25(OH)D3 SERPL-MCNC: 59 NG/ML (ref 30–100)
CHOLEST SERPL-MCNC: 203 MG/DL (ref 0–199)
CHOLESTEROL/HDL RATIO: 3.1
HDLC SERPL-MCNC: 65.4 MG/DL
LDLC SERPL CALC-MCNC: 120 MG/DL
NON HDL CHOLESTEROL: 138 MG/DL (ref 0–149)
TRIGL SERPL-MCNC: 89 MG/DL (ref 0–149)
VLDL: 18 MG/DL (ref 0–40)

## 2024-08-19 PROCEDURE — 80061 LIPID PANEL: CPT

## 2024-08-19 PROCEDURE — 82306 VITAMIN D 25 HYDROXY: CPT

## 2024-08-19 PROCEDURE — 36415 COLL VENOUS BLD VENIPUNCTURE: CPT

## 2024-09-09 DIAGNOSIS — Z01.419 WELL WOMAN EXAM: ICD-10-CM

## 2024-09-09 DIAGNOSIS — N95.2 VAGINAL ATROPHY: ICD-10-CM

## 2024-09-09 RX ORDER — ESTRADIOL 0.1 MG/G
CREAM VAGINAL
Qty: 42.5 G | Refills: 2 | Status: SHIPPED | OUTPATIENT
Start: 2024-09-09

## 2024-09-17 DIAGNOSIS — M54.2 CERVICAL PAIN: ICD-10-CM

## 2024-09-17 RX ORDER — GABAPENTIN 300 MG/1
300 CAPSULE ORAL NIGHTLY
Qty: 90 CAPSULE | Refills: 0 | Status: SHIPPED | OUTPATIENT
Start: 2024-09-17 | End: 2024-09-18 | Stop reason: SDUPTHER

## 2024-09-18 ENCOUNTER — APPOINTMENT (OUTPATIENT)
Dept: OBSTETRICS AND GYNECOLOGY | Facility: CLINIC | Age: 54
End: 2024-09-18
Payer: COMMERCIAL

## 2024-09-18 VITALS
HEIGHT: 60 IN | WEIGHT: 128 LBS | SYSTOLIC BLOOD PRESSURE: 134 MMHG | DIASTOLIC BLOOD PRESSURE: 80 MMHG | BODY MASS INDEX: 25.13 KG/M2

## 2024-09-18 DIAGNOSIS — M54.2 CERVICAL PAIN: ICD-10-CM

## 2024-09-18 DIAGNOSIS — Z01.419 WELL WOMAN EXAM: ICD-10-CM

## 2024-09-18 DIAGNOSIS — N95.2 VAGINAL ATROPHY: ICD-10-CM

## 2024-09-18 PROCEDURE — 1036F TOBACCO NON-USER: CPT | Performed by: OBSTETRICS & GYNECOLOGY

## 2024-09-18 PROCEDURE — 99214 OFFICE O/P EST MOD 30 MIN: CPT | Performed by: OBSTETRICS & GYNECOLOGY

## 2024-09-18 PROCEDURE — 3079F DIAST BP 80-89 MM HG: CPT | Performed by: OBSTETRICS & GYNECOLOGY

## 2024-09-18 PROCEDURE — 3008F BODY MASS INDEX DOCD: CPT | Performed by: OBSTETRICS & GYNECOLOGY

## 2024-09-18 PROCEDURE — 3075F SYST BP GE 130 - 139MM HG: CPT | Performed by: OBSTETRICS & GYNECOLOGY

## 2024-09-18 RX ORDER — GABAPENTIN 300 MG/1
300 CAPSULE ORAL NIGHTLY
Qty: 90 CAPSULE | Refills: 3 | Status: SHIPPED | OUTPATIENT
Start: 2024-09-18 | End: 2025-09-18

## 2024-09-18 RX ORDER — ESTRADIOL 0.1 MG/G
1 CREAM VAGINAL 2 TIMES WEEKLY
Qty: 42.5 G | Refills: 3 | Status: SHIPPED | OUTPATIENT
Start: 2024-09-19

## 2024-09-18 ASSESSMENT — PAIN SCALES - GENERAL: PAINLEVEL: 0-NO PAIN

## 2024-09-18 NOTE — PROGRESS NOTES
Pt here for ken consult. Had HYST done 6.2023    Chaperone declined: Nakita Galvan, CM3      Silva Chance is a 54 y.o. here for menopause    HPI: She has been dealting with menpausal things and then had hysterectomy last year.  The surgery went more.  At first it was more emotional, now she is noticing bladder symptoms that feel like UTI.  She felt like she needed to cry all the time.She is doing a test next week to check her bladder.  She increased her vaginal estrogen since then and eliminated acedic food and such.  She takes gabapentin for her hot flashes and that works for her hot flashes.  She has some warmth at night but it does not bother her.      GynHx: hyst for urogyn needs  STIs: none  Social History: She lives in [place] as well tx drs to [place],   Past med hx and past surg hx reviewed and notable for: ADHD and CHTN    Social History     Substance and Sexual Activity   Sexual Activity Yes    Partners: Male    Birth control/protection: Male Sterilization, Female Sterilization     Objective   /80   Ht 1.524 m (5')   Wt 58.1 kg (128 lb)   BMI 25.00 kg/m²      General:   Alert and oriented, in no acute distress   Neck:    Breast/Axilla:    Abdomen:    Vulva:    Vagina:    Cervix:    Uterus:    Adnexa:    Pelvic Floor    Psych Normal affect. Normal mood.      Assessment and Plan:  Menopause  Discussed goals of treatment is based on her symptoms and how well her symptoms are controlled.  She is currently using vaginal estrogen for vaginal dryness and gabapentin for help with sleep.  The gabapentin was started with her physician previously in [place].  We talked about options for hormonal therapy due to her hysterectomy she would not need progesterone and would only need a transdermal estrogen.  She initially did not use hormone therapy due to her chronic hypertension it is well-controlled right now on her antihypertensive.  She does have some symptoms of brain fog and previously had mood symptoms but  at this point she feels this is stable.  We discussed weight gain and libido and strategies to help with feeling comfortable in her body and initiating sex.  She will follow-up as needed if she would like to start hormone therapy.  Problem List Items Addressed This Visit    None     No orders of the defined types were placed in this encounter.

## 2024-10-03 ENCOUNTER — TELEPHONE (OUTPATIENT)
Dept: DERMATOLOGY | Facility: CLINIC | Age: 54
End: 2024-10-03
Payer: COMMERCIAL

## 2024-10-03 NOTE — TELEPHONE ENCOUNTER
[facility] called office to set up delivery of Botox for pts upcoming appt. Delivery is set for 10/8/24 to the [place] office.

## 2024-10-14 ENCOUNTER — APPOINTMENT (OUTPATIENT)
Dept: OBSTETRICS AND GYNECOLOGY | Facility: CLINIC | Age: 54
End: 2024-10-14
Payer: COMMERCIAL

## 2024-10-14 VITALS
SYSTOLIC BLOOD PRESSURE: 122 MMHG | DIASTOLIC BLOOD PRESSURE: 70 MMHG | WEIGHT: 127 LBS | HEIGHT: 60 IN | BODY MASS INDEX: 24.94 KG/M2

## 2024-10-14 DIAGNOSIS — N95.2 VAGINAL ATROPHY: Primary | ICD-10-CM

## 2024-10-14 DIAGNOSIS — R35.0 URINARY FREQUENCY: ICD-10-CM

## 2024-10-14 LAB
POC APPEARANCE, URINE: CLEAR
POC BILIRUBIN, URINE: NEGATIVE
POC BLOOD, URINE: NEGATIVE
POC COLOR, URINE: YELLOW
POC GLUCOSE, URINE: NEGATIVE MG/DL
POC KETONES, URINE: NEGATIVE MG/DL
POC LEUKOCYTES, URINE: NEGATIVE
POC NITRITE,URINE: NEGATIVE
POC PH, URINE: 7 PH
POC PROTEIN, URINE: NEGATIVE MG/DL
POC SPECIFIC GRAVITY, URINE: 1.02
POC UROBILINOGEN, URINE: 0.2 EU/DL

## 2024-10-14 PROCEDURE — 81003 URINALYSIS AUTO W/O SCOPE: CPT | Performed by: STUDENT IN AN ORGANIZED HEALTH CARE EDUCATION/TRAINING PROGRAM

## 2024-10-14 PROCEDURE — 99213 OFFICE O/P EST LOW 20 MIN: CPT | Performed by: STUDENT IN AN ORGANIZED HEALTH CARE EDUCATION/TRAINING PROGRAM

## 2024-10-14 PROCEDURE — 52000 CYSTOURETHROSCOPY: CPT | Performed by: STUDENT IN AN ORGANIZED HEALTH CARE EDUCATION/TRAINING PROGRAM

## 2024-10-14 ASSESSMENT — PAIN SCALES - GENERAL: PAINLEVEL: 0-NO PAIN

## 2024-10-14 NOTE — PROGRESS NOTES
Patient ID: Silva Chance is a 54 y.o. female.    Cystoscopy    Date/Time: 10/14/2024 8:36 AM    Performed by: Marcie Hendricks MD  Authorized by: Marcie Hendricks MD      Follow up visit for:   CYSTOSCOPY    This is a 54 y.o. y.o. patient who presents for cystoscopy.   Indication(s): LUTS, r/o mesh exposure (two prior midurethral slings)    Last visit     Vaginal atrophy  - continue vaginal estrogen, advised returning to 2-3 times weekly as daily estrogen is not helping her symptoms and she does not need that high a dose to see improvement in atrophy  - well estrogenized epithelium noted on exam today     LUTS, r/o mesh exposure versus IC/BPS  - two prior midurethral slings as above (2007 and 2014) with resolution of ELLA but now with persistent irritative voiding symptoms  - given symptoms worsening over the past year in this setting, recommend cystoscopy to r/o mesh exposure and evaluate bladder lining for signs of IC/BPS  IC/BPS  - reviewed pathophysiology and suspected etiologies of IC/BPS as well as clinical nature of diagnosis  - reviewed options for management  - info given today re: options for OTC management of IC/BPS: prelief, azo, baking soda in water  - recommend keeping diary of potential triggers  - recommend pelvic floor PT (referral made today; she did this in the past for pain with intercourse but feels she could improve from another round with a new focus on bladder symptoms)  - reviewed stress management strategies: mindfulness, meditation, yoga  - discussed possibility of adding medication: hydroxyzine, cimetidine, amitriptyline; deferring at this time pending further workup and management strategies as above  - urinalysis today with 3+ leukocytes, will sent urine culture to ensure no infection (recently completed antibiotics course)     RTC  for cystoscopy as above    Today she reports  Doing very well. Feels bladder symptoms have resolved. Using estrogen more regularly now 2-3 times per  week.      CYSTOSCOPY  Verbal and written consent was obtained and a Time Out was performed.    DOCUMENTATION OF UNIVERSAL PROTOCOL FOR INVASIVE PROCEDURES    Time-out was taken with all members of procedure team immediately prior to procedure and the following were confirmed: Correct patient identified  Agreement on procedure  Correct side and site  Correct patient position    PROCEDURE NOTE:   Type of Procedure:  cystoscopy  Procedure Date: 10/14/2024  Time:  8:08 AM  Performing Provider: Marcie Hendricks MD    Specimens Sent: n/a  Estimated Blood Loss: n/a    Form Completed By:  Marcie Hendricks MD  8:08 AM        Procedure:  The patient was positioned in the cystoscopy chair. The urethra was prepped in the usual fashion with betadine or paolo soap.  A straight catheter was inserted and the urinary bladder was drained.  Local anesthesia was administered with 2% lidocaine jelly transurethrally in the usual fashion. The bladder was distended with approximately 200mL of sterile water    Cystourethroscopy was performed with an Ambu flexible cystoscope.    Findings:  There were no vitals taken for this visit.  Urethra: normal mucosa, no masses or lesions, no evidence of urethral diverticula  Bladder neck:  unremarkable  Bladder:  normal mucosa, no masses, no lesions, no foreign body, no diverticulum, no cystitis cysticia, no squamous metaplasia, no inflamation, no acute cystitis, no stones  Ureteral orifices:  normal position and appearance,  efflux visualized bilaterally    The patient tolerated the procedure well: Yes    Impression: normal cystoscopy      Assessment and Plan:    1) Reviewed cystoscopy results, Silva Chance watched procedure on video screen.    2) Vaginal atrophy/LUTS (GSM)  - continue vaginal estrogen  - doing well with this regimen    RTC as needed    Marcie Hendricks MD

## 2024-10-15 ENCOUNTER — APPOINTMENT (OUTPATIENT)
Dept: DERMATOLOGY | Facility: CLINIC | Age: 54
End: 2024-10-15
Payer: COMMERCIAL

## 2024-10-15 DIAGNOSIS — L74.510 PRIMARY FOCAL HYPERHIDROSIS OF AXILLA: ICD-10-CM

## 2024-10-15 PROCEDURE — 64650 CHEMODENERV ECCRINE GLANDS: CPT | Performed by: DERMATOLOGY

## 2024-10-15 NOTE — PROGRESS NOTES
Subjective     Silva Chance is a 54 y.o. female who presents for the following: Botulinum Toxin Injection (Botox injections for hyperhidrosis to right and left axilla. Last visit 50 units each. She would not like to use ice packs prior to injections due to bad reaction at last visit.). Hyperhidrosis severity index is 1 out of 4 with Botox treatments.    Review of Systems:  No other skin or systemic complaints other than what is documented elsewhere in the note.    The following portions of the chart were reviewed this encounter and updated as appropriate:          Skin Cancer History  No skin cancer on file.      Specialty Problems    None       Objective   Well appearing patient in no apparent distress; mood and affect are within normal limits.    A focused skin examination was performed of bilateral axilla. All findings within normal limits unless otherwise noted below.    Assessment/Plan   1. Primary focal hyperhidrosis of axilla (2)  Left Axilla, Right Axilla  Excessive sweating with the skin visibly damp.    Hyperhidrosis is a common condition in which a person sweats excessively. The sweating may affect the whole of your body, or it may only affect certain areas. Commonly affected areas include the armpits, palms and soles.   It is believed to be due to an underlying thermoregulatory dysfunction.  Treatments include topical therapy with Drysol (which may be drying and irritating to the skin), topical Qbrexza cloths, oral anti-cholinergic medication, iontophoresis devices and in recalcitrant cases can consider Botox injection.    Patient has been doing well on Botox injections for years elected for repeat treatment today. Has PA on file.      Chemodenervation - Left Axilla, Right Axilla    Date/Time: 10/15/2024 8:15 AM    Performed by: Allison Hargrove DO  Authorized by: Allison Hargrove,   not cosmetic  Medical - Botulinum toxin injection:  yes    Consent:      Consent obtained:  Written     Consent given by:   Patient     Risks discussed:  Poor cosmetic result     Alternatives discussed:  No treatment    Universal protocol:      Relevant documents present and verified:  Yes       Site/side verified:  Yes       Patient identity confirmed:  Verbally with patient    Pre-procedure details:   Prep type:  Isopropyl alcohol    Procedure details:      Diluted by:  Preservative free saline     Toxin (Brand):  OnaBoNT-A (Botox)     Concentration (u/mL):  2U/0.1mL    Total units injected:  100    Post-procedure details:      Patient tolerance of procedure:  Tolerated well, no immediate complications    Comments: Units injected: 100U - 50U each axilla      Related Procedures  Prior Authorization for Hyperhidrosis and Botox  Prior Authorization for Hyperhidrosis and Botox    Related Medications  Botox 100 unit injection  PHYSICIAN TO INJECT 50 UNITS TO EACH AXILLA EVERY 3 MONTHS    onabotulinumtoxinA (Botox) injection 100 Units        Follow up in 3 months.

## 2024-11-06 ENCOUNTER — APPOINTMENT (OUTPATIENT)
Dept: PHYSICAL THERAPY | Facility: CLINIC | Age: 54
End: 2024-11-06
Payer: COMMERCIAL

## 2024-11-14 ENCOUNTER — APPOINTMENT (OUTPATIENT)
Dept: PHYSICAL THERAPY | Facility: CLINIC | Age: 54
End: 2024-11-14
Payer: COMMERCIAL

## 2024-11-19 ENCOUNTER — APPOINTMENT (OUTPATIENT)
Dept: PHYSICAL THERAPY | Facility: CLINIC | Age: 54
End: 2024-11-19
Payer: COMMERCIAL

## 2024-11-21 ENCOUNTER — APPOINTMENT (OUTPATIENT)
Dept: PHYSICAL THERAPY | Facility: CLINIC | Age: 54
End: 2024-11-21
Payer: COMMERCIAL

## 2024-11-26 ENCOUNTER — APPOINTMENT (OUTPATIENT)
Dept: PHYSICAL THERAPY | Facility: CLINIC | Age: 54
End: 2024-11-26
Payer: COMMERCIAL

## 2024-12-03 ENCOUNTER — APPOINTMENT (OUTPATIENT)
Dept: PHYSICAL THERAPY | Facility: CLINIC | Age: 54
End: 2024-12-03
Payer: COMMERCIAL

## 2024-12-05 ENCOUNTER — APPOINTMENT (OUTPATIENT)
Dept: PHYSICAL THERAPY | Facility: CLINIC | Age: 54
End: 2024-12-05
Payer: COMMERCIAL

## 2024-12-09 ENCOUNTER — APPOINTMENT (OUTPATIENT)
Dept: PRIMARY CARE | Facility: CLINIC | Age: 54
End: 2024-12-09
Payer: COMMERCIAL

## 2024-12-09 VITALS
HEIGHT: 60 IN | SYSTOLIC BLOOD PRESSURE: 132 MMHG | HEART RATE: 69 BPM | DIASTOLIC BLOOD PRESSURE: 85 MMHG | BODY MASS INDEX: 25.32 KG/M2 | RESPIRATION RATE: 16 BRPM | OXYGEN SATURATION: 98 % | WEIGHT: 129 LBS | TEMPERATURE: 98.1 F

## 2024-12-09 DIAGNOSIS — Z12.31 ENCOUNTER FOR SCREENING MAMMOGRAM FOR MALIGNANT NEOPLASM OF BREAST: ICD-10-CM

## 2024-12-09 DIAGNOSIS — R35.0 URINARY FREQUENCY: ICD-10-CM

## 2024-12-09 DIAGNOSIS — F90.8 ADHD, ADULT RESIDUAL TYPE: ICD-10-CM

## 2024-12-09 DIAGNOSIS — N39.0 ACUTE UTI: Primary | ICD-10-CM

## 2024-12-09 DIAGNOSIS — N39.0 FREQUENT UTI: ICD-10-CM

## 2024-12-09 DIAGNOSIS — I10 BENIGN ESSENTIAL HYPERTENSION: Chronic | ICD-10-CM

## 2024-12-09 LAB
AMPHETAMINES UR QL SCN: NORMAL
BARBITURATES UR QL SCN: NORMAL
BENZODIAZ UR QL SCN: NORMAL
BZE UR QL SCN: NORMAL
CANNABINOIDS UR QL SCN: NORMAL
FENTANYL+NORFENTANYL UR QL SCN: NORMAL
METHADONE UR QL SCN: NORMAL
OPIATES UR QL SCN: NORMAL
OXYCODONE+OXYMORPHONE UR QL SCN: NORMAL
PCP UR QL SCN: NORMAL
POC APPEARANCE, URINE: CLEAR
POC BILIRUBIN, URINE: NEGATIVE
POC BLOOD, URINE: NEGATIVE
POC COLOR, URINE: YELLOW
POC GLUCOSE, URINE: NEGATIVE MG/DL
POC KETONES, URINE: NEGATIVE MG/DL
POC LEUKOCYTES, URINE: ABNORMAL
POC NITRITE,URINE: NEGATIVE
POC PH, URINE: 7 PH
POC PROTEIN, URINE: NEGATIVE MG/DL
POC SPECIFIC GRAVITY, URINE: 1.01
POC UROBILINOGEN, URINE: 1 EU/DL

## 2024-12-09 PROCEDURE — 80307 DRUG TEST PRSMV CHEM ANLYZR: CPT

## 2024-12-09 PROCEDURE — 3075F SYST BP GE 130 - 139MM HG: CPT | Performed by: INTERNAL MEDICINE

## 2024-12-09 PROCEDURE — 81002 URINALYSIS NONAUTO W/O SCOPE: CPT | Performed by: INTERNAL MEDICINE

## 2024-12-09 PROCEDURE — 87086 URINE CULTURE/COLONY COUNT: CPT

## 2024-12-09 PROCEDURE — 87186 SC STD MICRODIL/AGAR DIL: CPT

## 2024-12-09 PROCEDURE — 3008F BODY MASS INDEX DOCD: CPT | Performed by: INTERNAL MEDICINE

## 2024-12-09 PROCEDURE — 1036F TOBACCO NON-USER: CPT | Performed by: INTERNAL MEDICINE

## 2024-12-09 PROCEDURE — 3079F DIAST BP 80-89 MM HG: CPT | Performed by: INTERNAL MEDICINE

## 2024-12-09 PROCEDURE — 99214 OFFICE O/P EST MOD 30 MIN: CPT | Performed by: INTERNAL MEDICINE

## 2024-12-09 RX ORDER — NITROFURANTOIN 25; 75 MG/1; MG/1
100 CAPSULE ORAL DAILY PRN
Qty: 30 CAPSULE | Refills: 0 | Status: SHIPPED | OUTPATIENT
Start: 2024-12-09

## 2024-12-09 RX ORDER — CEPHALEXIN 500 MG/1
500 CAPSULE ORAL 3 TIMES DAILY
Qty: 21 CAPSULE | Refills: 0 | Status: SHIPPED | OUTPATIENT
Start: 2024-12-09 | End: 2024-12-16

## 2024-12-09 RX ORDER — LISDEXAMFETAMINE DIMESYLATE 10 MG/1
10 CAPSULE ORAL DAILY PRN
Qty: 30 CAPSULE | Refills: 0 | Status: SHIPPED | OUTPATIENT
Start: 2024-12-09 | End: 2025-01-08

## 2024-12-09 ASSESSMENT — ENCOUNTER SYMPTOMS
DYSURIA: 1
NAUSEA: 0
MYALGIAS: 0
VOMITING: 0
WOUND: 0
PALPITATIONS: 0
DIARRHEA: 0
COUGH: 0
SHORTNESS OF BREATH: 0
ARTHRALGIAS: 0
FREQUENCY: 1
DYSPHORIC MOOD: 0
EYE PAIN: 0
RHINORRHEA: 0
DIZZINESS: 0
HEMATURIA: 0
CONSTIPATION: 0
NERVOUS/ANXIOUS: 0
CHEST TIGHTNESS: 0
POLYPHAGIA: 0
POLYDIPSIA: 0
WHEEZING: 0
UNEXPECTED WEIGHT CHANGE: 0
HEADACHES: 0
FEVER: 0
CHILLS: 0
BLOOD IN STOOL: 0
ABDOMINAL PAIN: 0
SORE THROAT: 0

## 2024-12-09 NOTE — PROGRESS NOTES
Subjective   Patient ID: Silva Chance is a 54 y.o. female who presents for Follow-up and UTI.    HPI     Here for followup  BP doing ok    States ADHD is not great  Worsened with menopause  Wants to retry vyvanse and see how BP is doing    Stopped gabapentin did not think helped a lot  Consider HRT but just does not want to do meds    Has UTI feels like  Tried her macrobid bid x 1 week. Did not help    OARRS:  Lydia Garcia MD on 12/9/2024  4:10 PM  I have personally reviewed the OARRS report for Silva Chance. I have considered the risks of abuse, dependence, addiction and diversion and I believe that it is clinically appropriate for Silva Chance to be prescribed this medication    Is the patient prescribed a combination of a benzodiazepine and opioid?  No    Last Urine Drug Screen / ordered today: Yes  No results found for this or any previous visit (from the past 8760 hours).  N/A        Controlled Substance Agreement:  Date of the Last Agreement: 12/9/24  Reviewed Controlled Substance Agreement including but not limited to the benefits, risks, and alternatives to treatment with a Controlled Substance medication(s).        Review of Systems   Constitutional:  Negative for chills, fever and unexpected weight change.   HENT:  Negative for congestion, hearing loss, rhinorrhea and sore throat.    Eyes:  Negative for pain and visual disturbance.   Respiratory:  Negative for cough, chest tightness, shortness of breath and wheezing.    Cardiovascular:  Negative for chest pain and palpitations.   Gastrointestinal:  Negative for abdominal pain, blood in stool, constipation, diarrhea, nausea and vomiting.   Endocrine: Negative for cold intolerance, heat intolerance, polydipsia and polyphagia.   Genitourinary:  Positive for dysuria and frequency. Negative for hematuria.   Musculoskeletal:  Negative for arthralgias and myalgias.   Skin:  Negative for rash and wound.   Neurological:  Negative for dizziness, syncope and headaches.    Psychiatric/Behavioral:  Negative for dysphoric mood. The patient is not nervous/anxious.        Objective   /85 (BP Location: Left arm, Patient Position: Sitting, BP Cuff Size: Adult)   Pulse 69   Temp 36.7 °C (98.1 °F) (Temporal)   Resp 16   Ht 1.524 m (5')   Wt 58.5 kg (129 lb)   SpO2 98%   BMI 25.19 kg/m²     Physical Exam  Constitutional:       Appearance: Normal appearance.   Cardiovascular:      Rate and Rhythm: Normal rate and regular rhythm.      Heart sounds: Normal heart sounds. No murmur heard.     No gallop.   Pulmonary:      Effort: Pulmonary effort is normal. No respiratory distress.      Breath sounds: Normal breath sounds.   Musculoskeletal:      Right lower leg: No edema.      Left lower leg: No edema.   Neurological:      Mental Status: She is alert.       Assessment/Plan   Problem List Items Addressed This Visit             ICD-10-CM    ADHD, adult residual type F90.8    Relevant Medications    lisdexamfetamine (Vyvanse) 10 MG capsule    Other Relevant Orders    Drug Screen, Urine With Reflex to Confirmation    Benign essential hypertension (Chronic) I10    Urinary frequency R35.0    Relevant Orders    POCT UA (nonautomated) manually resulted (Completed)     Other Visit Diagnoses         Codes    Acute UTI    -  Primary N39.0    Relevant Medications    cephalexin (Keflex) 500 mg capsule    Other Relevant Orders    Urine Culture    Frequent UTI     N39.0    Relevant Medications    nitrofurantoin, macrocrystal-monohydrate, (Macrobid) 100 mg capsule    Encounter for screening mammogram for malignant neoplasm of breast     Z12.31    Relevant Orders    BI mammo bilateral screening tomosynthesis             Menopause  -discussed working on staying active, getting good sleep  -saw Dr. Chapin  -discussed if Vyvanse does not help-consider HRT    Arthralgias- workup per rheum     SOB- EKG WNL  -states very mild and just changes with having BP  -states she is fine and not concerned     HTN:  controlled on Losartan     Ovarian cyst-on intrarosa     Colitis: resolved     Anemia: increase iron in diet     ADHD:   -straterra caused dizziness  -high BP with vyvanse--resume and track BOP> Trial lower dose  -CSA and UDS 12/9/24  -bupropion did not help in past  -does not want to try anything    Acute UTI-keflex  -send for culture     GERD- omeprazole     S/p hyst with hx of prolapse-send to pelvic floor therapy     Flight anxiety/travel  -given seasick patches     Recurrent UTI  -macrobid prn     Hx of galactorrhea and had surgery     Hyperhidrosis: does botox with derm     Followup 6 months     Health Maintenance  -Pap smear: s/p hyst with cervix removed  -Vaccinations: UTD shingrix, tdap, flu  -Mammogram: 12/23- normal  -Colonoscopy: 10/22- 7 years  -DEXA: in [place]     . Lives in [place]. 2nd home in [place]  Owns yoga studio

## 2024-12-10 ENCOUNTER — APPOINTMENT (OUTPATIENT)
Dept: PHYSICAL THERAPY | Facility: CLINIC | Age: 54
End: 2024-12-10
Payer: COMMERCIAL

## 2024-12-11 LAB — BACTERIA UR CULT: ABNORMAL

## 2024-12-12 ENCOUNTER — APPOINTMENT (OUTPATIENT)
Dept: PHYSICAL THERAPY | Facility: CLINIC | Age: 54
End: 2024-12-12
Payer: COMMERCIAL

## 2024-12-16 ENCOUNTER — PATIENT MESSAGE (OUTPATIENT)
Dept: PRIMARY CARE | Facility: CLINIC | Age: 54
End: 2024-12-16
Payer: COMMERCIAL

## 2024-12-16 ENCOUNTER — LAB (OUTPATIENT)
Dept: LAB | Facility: LAB | Age: 54
End: 2024-12-16
Payer: COMMERCIAL

## 2024-12-16 DIAGNOSIS — R39.9 UTI SYMPTOMS: Primary | ICD-10-CM

## 2024-12-16 DIAGNOSIS — R39.9 UTI SYMPTOMS: ICD-10-CM

## 2024-12-16 PROCEDURE — 81003 URINALYSIS AUTO W/O SCOPE: CPT

## 2024-12-17 LAB
APPEARANCE UR: CLEAR
BILIRUB UR STRIP.AUTO-MCNC: NEGATIVE MG/DL
COLOR UR: NORMAL
GLUCOSE UR STRIP.AUTO-MCNC: NORMAL MG/DL
HOLD SPECIMEN: NORMAL
KETONES UR STRIP.AUTO-MCNC: NEGATIVE MG/DL
LEUKOCYTE ESTERASE UR QL STRIP.AUTO: NEGATIVE
NITRITE UR QL STRIP.AUTO: NEGATIVE
PH UR STRIP.AUTO: 6 [PH]
PROT UR STRIP.AUTO-MCNC: NEGATIVE MG/DL
RBC # UR STRIP.AUTO: NEGATIVE /UL
SP GR UR STRIP.AUTO: 1.01
UROBILINOGEN UR STRIP.AUTO-MCNC: NORMAL MG/DL

## 2024-12-26 DIAGNOSIS — F90.9 ATTENTION DEFICIT HYPERACTIVITY DISORDER (ADHD), UNSPECIFIED ADHD TYPE: Primary | ICD-10-CM

## 2024-12-26 RX ORDER — LISDEXAMFETAMINE DIMESYLATE 20 MG/1
20 CAPSULE ORAL EVERY MORNING
Qty: 30 CAPSULE | Refills: 0 | Status: SHIPPED | OUTPATIENT
Start: 2024-12-26 | End: 2025-01-25

## 2025-01-03 ENCOUNTER — TELEPHONE (OUTPATIENT)
Dept: DERMATOLOGY | Facility: CLINIC | Age: 55
End: 2025-01-03
Payer: COMMERCIAL

## 2025-01-03 DIAGNOSIS — L74.510 HYPERHIDROSIS OF AXILLA: Primary | ICD-10-CM

## 2025-01-03 DIAGNOSIS — L74.510 PRIMARY FOCAL HYPERHIDROSIS OF AXILLA: ICD-10-CM

## 2025-01-03 RX ORDER — ONABOTULINUMTOXINA 100 [USP'U]/1
INJECTION, POWDER, LYOPHILIZED, FOR SOLUTION INTRADERMAL; INTRAMUSCULAR
Qty: 1 EACH | Refills: 3 | Status: SHIPPED | OUTPATIENT
Start: 2025-01-03

## 2025-01-03 NOTE — TELEPHONE ENCOUNTER
Received fax from [facility] stating pt now has to use [facility] for Botox prescription.

## 2025-01-07 ENCOUNTER — TELEPHONE (OUTPATIENT)
Dept: DERMATOLOGY | Facility: CLINIC | Age: 55
End: 2025-01-07
Payer: COMMERCIAL

## 2025-01-07 DIAGNOSIS — L74.510 PRIMARY FOCAL HYPERHIDROSIS OF AXILLA: ICD-10-CM

## 2025-01-07 RX ORDER — ONABOTULINUMTOXINA 100 [USP'U]/1
INJECTION, POWDER, LYOPHILIZED, FOR SOLUTION INTRADERMAL; INTRAMUSCULAR
Qty: 1 EACH | Refills: 3 | Status: SHIPPED | OUTPATIENT
Start: 2025-01-07

## 2025-01-07 NOTE — TELEPHONE ENCOUNTER
Received fax from [facility] stating pts Botox cannot be dispensed from them. It was transferred to [facility].     Call placed to [facility] to set up delivery of Botox. Per [facility] they have not received any Botox prescription. Per [facility] they will contact office to set up  delivery once pt has given consent to deliver. Please forward prescription to [facility].

## 2025-01-14 ENCOUNTER — APPOINTMENT (OUTPATIENT)
Dept: DERMATOLOGY | Facility: CLINIC | Age: 55
End: 2025-01-14
Payer: COMMERCIAL

## 2025-01-15 ENCOUNTER — TELEPHONE (OUTPATIENT)
Dept: DERMATOLOGY | Facility: CLINIC | Age: 55
End: 2025-01-15
Payer: COMMERCIAL

## 2025-01-15 NOTE — TELEPHONE ENCOUNTER
Call placed to [facility] regarding delivery of pts Botox. Per [facility] rep. Botox will be ready this week and once pt is contact delivery will be set up to [facility].

## 2025-01-16 ENCOUNTER — HOSPITAL ENCOUNTER (OUTPATIENT)
Dept: RADIOLOGY | Facility: CLINIC | Age: 55
Discharge: HOME | End: 2025-01-16
Payer: COMMERCIAL

## 2025-01-16 VITALS — BODY MASS INDEX: 24.35 KG/M2 | HEIGHT: 61 IN | WEIGHT: 129 LBS

## 2025-01-16 DIAGNOSIS — I10 HYPERTENSION, UNSPECIFIED TYPE: ICD-10-CM

## 2025-01-16 DIAGNOSIS — Z12.31 ENCOUNTER FOR SCREENING MAMMOGRAM FOR MALIGNANT NEOPLASM OF BREAST: ICD-10-CM

## 2025-01-16 PROCEDURE — 77067 SCR MAMMO BI INCL CAD: CPT

## 2025-01-16 RX ORDER — LOSARTAN POTASSIUM 50 MG/1
50 TABLET ORAL DAILY
Qty: 90 TABLET | Refills: 1 | Status: SHIPPED | OUTPATIENT
Start: 2025-01-16

## 2025-01-17 ENCOUNTER — TELEPHONE (OUTPATIENT)
Dept: DERMATOLOGY | Facility: CLINIC | Age: 55
End: 2025-01-17
Payer: COMMERCIAL

## 2025-01-17 NOTE — TELEPHONE ENCOUNTER
Hi, someone can help me out with this. Pts appt has been pushed out (2/18/25) with Dr. Hargrove being out of the office currently. Please see below.    1/3/25-received a fax [facility] stating pt now has to use [facility] for Botox prescription. Rx was rerouted there.     1/7/25-Received [facility] stating pts Botox cannot be dispensed from them. It was transferred to [facility].    Today received PA request(scanned in chart) for pts Botox from [facility].     Thanks,   Apryl

## 2025-01-28 ENCOUNTER — TELEPHONE (OUTPATIENT)
Dept: DERMATOLOGY | Facility: CLINIC | Age: 55
End: 2025-01-28
Payer: COMMERCIAL

## 2025-01-28 NOTE — PROGRESS NOTES
Pt here for ken follow up Had HYST done 6.2023    Chaperone declined: Nakita Galvan, CM3      Silva Chance is a 54 y.o. here for menopause wants to start estrogen.    HPI: She saw Dr. Hendricks for cystoscopy all normal.  Using vaginal estrogen.  Noticing more menopausal symtpoms and would like to initiate. She has noticed no sex drive, vulvar and vaginal dryness and vulvar uncomfortableness.  She is only taking the losartin for her BP.  She is feeling more foggy, not as many hot flashes here and there, fatigue.  She feels like she has no drive for anything and she can get more depressed.    GynHx: hyst for urogyn needs  STIs: none  Social History: She lives in [place] as well tx drs to [place],   Past med hx and past surg hx reviewed and notable for: ADHD and CHTN    Social History     Substance and Sexual Activity   Sexual Activity Yes    Partners: Male    Birth control/protection: Male Sterilization, Female Sterilization     Objective   There were no vitals taken for this visit.     General:   Alert and oriented, in no acute distress   Neck:    Breast/Axilla:    Abdomen:    Vulva:    Vagina:    Cervix:    Uterus:    Adnexa:    Pelvic Floor    Psych Normal affect. Normal mood.      Assessment and Plan:  Menopause  She is currently using vaginal estrogen for vaginal dryness and still has symptoms- will keep this and add a estradiol patch and see if the combo relieves this symptom.  We will start the estradiol 0.5 mcg patch and she will journal about her mood, motivation and if she notices a difference as she is worried that this could be a component of depression.  We discussed transdermal estrogen due to her chronic hypertension well-controlled on her antihypertensive and how oral estrogen can change cholesterol and we want to minimize her CV risks.    This was a Telemedicine Visit over a virtual platform.  All medical issues were discussed and addressed but no physical exam was performed, other than what could be  evaluated over video and with discussion.  The patient verbally consented to the visit.  Spent 15 minutes with the patient and additional 5 minutes on orders, review of medical records, notes and documentation, counseling and coordination of care.       Problem List Items Addressed This Visit    None  Visit Diagnoses       Menopausal syndrome (hot flushes)    -  Primary    Relevant Medications    estradiol (Vivelle-DOT) 0.05 mg/24 hr patch (Start on 1/30/2025)           No orders of the defined types were placed in this encounter.

## 2025-01-28 NOTE — TELEPHONE ENCOUNTER
Call placed to [facility] for delivery of Botox. Pts appt 2/18/25 in [place]. Per [facility] they contacted pt to obtained consent, did not reach her and they left a message for her to contact them. Per [facility] pts copay is $775.02.  Number to call to set up delivery. [phone]

## 2025-01-29 ENCOUNTER — TELEPHONE (OUTPATIENT)
Dept: DERMATOLOGY | Facility: CLINIC | Age: 55
End: 2025-01-29

## 2025-01-29 ENCOUNTER — TELEMEDICINE (OUTPATIENT)
Dept: OBSTETRICS AND GYNECOLOGY | Facility: CLINIC | Age: 55
End: 2025-01-29
Payer: COMMERCIAL

## 2025-01-29 DIAGNOSIS — N95.1 MENOPAUSAL SYNDROME (HOT FLUSHES): Primary | ICD-10-CM

## 2025-01-29 PROCEDURE — 99213 OFFICE O/P EST LOW 20 MIN: CPT | Performed by: OBSTETRICS & GYNECOLOGY

## 2025-01-29 RX ORDER — ESTRADIOL 0.05 MG/D
1 FILM, EXTENDED RELEASE TRANSDERMAL 2 TIMES WEEKLY
Qty: 26 PATCH | Refills: 3 | Status: SHIPPED | OUTPATIENT
Start: 2025-01-30 | End: 2026-01-30

## 2025-01-29 NOTE — TELEPHONE ENCOUNTER
Call placed to pt regarding Botox and copay. Pt could not talk and stated she would call office back.     Pt returned call and had everything sorted out with Botox copay card and [facility] giving consent for delivery this time and in future.

## 2025-02-07 ENCOUNTER — PATIENT MESSAGE (OUTPATIENT)
Dept: PRIMARY CARE | Facility: CLINIC | Age: 55
End: 2025-02-07
Payer: COMMERCIAL

## 2025-02-07 DIAGNOSIS — F90.9 ATTENTION DEFICIT HYPERACTIVITY DISORDER (ADHD), UNSPECIFIED ADHD TYPE: ICD-10-CM

## 2025-02-07 RX ORDER — LISDEXAMFETAMINE DIMESYLATE 10 MG/1
10 CAPSULE ORAL EVERY MORNING
Qty: 30 CAPSULE | Refills: 0 | Status: SHIPPED | OUTPATIENT
Start: 2025-02-07 | End: 2025-03-09

## 2025-02-11 ENCOUNTER — APPOINTMENT (OUTPATIENT)
Dept: DERMATOLOGY | Facility: CLINIC | Age: 55
End: 2025-02-11
Payer: COMMERCIAL

## 2025-02-18 ENCOUNTER — APPOINTMENT (OUTPATIENT)
Dept: DERMATOLOGY | Facility: CLINIC | Age: 55
End: 2025-02-18
Payer: COMMERCIAL

## 2025-02-18 DIAGNOSIS — L74.510 PRIMARY FOCAL HYPERHIDROSIS OF AXILLA: ICD-10-CM

## 2025-02-18 PROCEDURE — 64650 CHEMODENERV ECCRINE GLANDS: CPT | Performed by: DERMATOLOGY

## 2025-02-18 ASSESSMENT — PHYSICIAN GLOBAL ASSESSMENT (PGA): WHAT IS THE PGA: PHYSICIAN GLOBAL ASSESSMENT:  3 - MODERATE

## 2025-02-18 ASSESSMENT — PATIENT GLOBAL ASSESSMENT (PGA): PATIENT GLOBAL ASSESSMENT: PATIENT GLOBAL ASSESSMENT:  3 - MODERATE

## 2025-02-18 ASSESSMENT — BODY SURFACE AREA (BSA): WHAT IS THE BSA PERCENTAGE: < 3% BODY SURFACE AREA INVOLVED

## 2025-02-18 NOTE — PROGRESS NOTES
Hever Chance is a 54 y.o. female who presents for the following: Excessive Sweating (Bilateral axilla. Currently receiving botox injections. Last treatment 10/15/24. Hyperhidrosis severity index is 3 out of 4 without treatment with treatment 1 out of 4. ).     Review of Systems:  No other skin or systemic complaints other than what is documented elsewhere in the note.    The following portions of the chart were reviewed this encounter and updated as appropriate:          Skin Cancer History  No skin cancer on file.      Specialty Problems    None       Objective   Well appearing patient in no apparent distress; mood and affect are within normal limits.    A focused skin examination was performed of bilateral axilla. All findings within normal limits unless otherwise noted below.    Assessment/Plan   1. Primary focal hyperhidrosis of axilla (2)  Left Axilla, Right Axilla  Excessive sweating with the skin visibly damp.    Hyperhidrosis is a common condition in which a person sweats excessively. The sweating may affect the whole of your body, or it may only affect certain areas. Commonly affected areas include the armpits, palms and soles.   It is believed to be due to an underlying thermoregulatory dysfunction.  Treatments include topical therapy with Drysol (which may be drying and irritating to the skin), topical Qbrexza cloths, oral anti-cholinergic medication, iontophoresis devices and in recalcitrant cases can consider Botox injection.    Patient has been doing well on Botox injections for years elected for repeat treatment today. Has PA on file.      Chemodenervation - Left Axilla, Right Axilla    Date/Time: 2/18/2025 3:51 PM    Performed by: Allison Hargrove DO  Authorized by: Allison Hargrove DO  not cosmetic  Medical - Botulinum toxin injection:  yes    Consent:      Consent obtained:  Written     Consent given by:  Patient     Risks discussed:  Poor cosmetic result     Alternatives discussed:  No  treatment    Universal protocol:      Relevant documents present and verified:  Yes       Site/side verified:  Yes       Patient identity confirmed:  Verbally with patient    Pre-procedure details:   Prep type:  Isopropyl alcohol    Procedure details:      Diluted by:  Preservative free saline     Toxin (Brand):  OnaBoNT-A (Botox)     Concentration (u/mL):  2U/0.1mL    Total units injected:  100    Post-procedure details:      Patient tolerance of procedure:  Tolerated well, no immediate complications    Comments: Units injected: 100 - 50 each axilla      Related Procedures  Prior Authorization for Hyperhidrosis and Botox  Prior Authorization for Hyperhidrosis and Botox    Related Medications  Botox 100 unit injection  PHYSICIAN TO INJECT 50 UNITS TO EACH AXILLA EVERY 3 MONTHS    onabotulinumtoxinA (Botox) injection 100 Units        New PA for procedure submitted today.   Follow up in 3-4 months for next injection

## 2025-03-05 ENCOUNTER — APPOINTMENT (OUTPATIENT)
Dept: OBSTETRICS AND GYNECOLOGY | Facility: CLINIC | Age: 55
End: 2025-03-05
Payer: COMMERCIAL

## 2025-04-09 DIAGNOSIS — F90.9 ATTENTION DEFICIT HYPERACTIVITY DISORDER (ADHD), UNSPECIFIED ADHD TYPE: ICD-10-CM

## 2025-04-09 RX ORDER — LISDEXAMFETAMINE DIMESYLATE 10 MG/1
10 CAPSULE ORAL EVERY MORNING
Qty: 30 CAPSULE | Refills: 0 | Status: SHIPPED | OUTPATIENT
Start: 2025-04-09 | End: 2025-05-09

## 2025-04-16 ENCOUNTER — APPOINTMENT (OUTPATIENT)
Dept: OBSTETRICS AND GYNECOLOGY | Facility: CLINIC | Age: 55
End: 2025-04-16
Payer: COMMERCIAL

## 2025-04-16 VITALS — BODY MASS INDEX: 24.35 KG/M2 | WEIGHT: 129 LBS | HEIGHT: 61 IN

## 2025-04-16 DIAGNOSIS — M25.551 BILATERAL HIP PAIN: ICD-10-CM

## 2025-04-16 DIAGNOSIS — M25.552 BILATERAL HIP PAIN: ICD-10-CM

## 2025-04-16 PROCEDURE — 99213 OFFICE O/P EST LOW 20 MIN: CPT | Performed by: OBSTETRICS & GYNECOLOGY

## 2025-04-16 PROCEDURE — 1036F TOBACCO NON-USER: CPT | Performed by: OBSTETRICS & GYNECOLOGY

## 2025-04-16 PROCEDURE — 3008F BODY MASS INDEX DOCD: CPT | Performed by: OBSTETRICS & GYNECOLOGY

## 2025-04-16 RX ORDER — IBUPROFEN 800 MG/1
800 TABLET ORAL DAILY PRN
Qty: 60 TABLET | Refills: 0 | Status: SHIPPED | OUTPATIENT
Start: 2025-04-16

## 2025-04-16 ASSESSMENT — PAIN SCALES - GENERAL: PAINLEVEL_OUTOF10: 0-NO PAIN

## 2025-04-16 NOTE — PROGRESS NOTES
"3 month virtual medication check / refill. Pt would like to get a refill on ibuprofen as well    Chaperone declined: Nakita Galvan, CM3      Silva Chance is a 54 y.o. here for menopause wants to start estrogen.    HPI: She noticed that the patch is working well.  [place] is still a bit uncomfortable.  The cream is a bit irritating to her vulva, she feels vulvar irritation that was every day prior to vaginal estrgen and is now about 2-3 times a week.  After her hyst she feels like sex is so different.  Sex hurts and the pt helped a bit, but now it is mostly dryness, they use lube- astroglide.  It is both with insertion and and thrusting.  Using vaginal estrogen cream and the patch and helping with some of the vaginal issues however room for improvement.    GynHx: hyst for urogyn  STIs: none  Social History: She lives in [place] well [place] drs to [place],   Past med hx and past surg hx reviewed and notable for: ADHD and CHTN    Social History     Substance and Sexual Activity   Sexual Activity Yes    Partners: Male    Birth control/protection: Male Sterilization, Female Sterilization     Objective   Ht 1.549 m (5' 1\")   Wt 58.5 kg (129 lb)   BMI 24.37 kg/m²      General:   Alert and oriented, in no acute distress   Neck:    Breast/Axilla:    Abdomen:    Vulva:    Vagina:    Cervix:    Uterus:    Adnexa:    Pelvic Floor    Psych Normal affect. Normal mood.      Assessment and Plan:  Menopause  She is currently using vaginal estrogen for vaginal dryness and still has symptoms- will keep this and add a estradiol patch and see if the combo relieves this symptom.  Continue the estradiol 0.5 mcg patch improved mood, motivation.  Vaginal pain with sex- will try a better lubricant and if no improvement then will come in for exam as there is still pain and will see if there is still pelvic floor difficulties causing the pain.  Ibuprofen refilled for arthritis    This was a Telemedicine Visit over a virtual platform.  All " medical issues were discussed and addressed but no physical exam was performed, other than what could be evaluated over video and with discussion.  The patient verbally consented to the visit.  Spent 12 minutes with the patient and additional 10 minutes on orders, review of medical records, notes and documentation, counseling and coordination of care.        Problem List Items Addressed This Visit       Bilateral hip pain    Relevant Medications    ibuprofen 800 mg tablet        No orders of the defined types were placed in this encounter.

## 2025-05-06 ENCOUNTER — PATIENT MESSAGE (OUTPATIENT)
Dept: PRIMARY CARE | Facility: CLINIC | Age: 55
End: 2025-05-06

## 2025-05-06 ENCOUNTER — APPOINTMENT (OUTPATIENT)
Dept: DERMATOLOGY | Facility: CLINIC | Age: 55
End: 2025-05-06
Payer: COMMERCIAL

## 2025-05-06 DIAGNOSIS — F41.9 ANXIETY: Primary | ICD-10-CM

## 2025-05-06 DIAGNOSIS — D18.01 HEMANGIOMA OF SKIN: ICD-10-CM

## 2025-05-06 DIAGNOSIS — D23.9 DERMATOFIBROMA: ICD-10-CM

## 2025-05-06 DIAGNOSIS — D22.60 MELANOCYTIC NEVI OF UNSPECIFIED UPPER LIMB, INCLUDING SHOULDER: ICD-10-CM

## 2025-05-06 DIAGNOSIS — L74.510 PRIMARY FOCAL HYPERHIDROSIS OF AXILLA: ICD-10-CM

## 2025-05-06 DIAGNOSIS — D22.5 MELANOCYTIC NEVI OF TRUNK: ICD-10-CM

## 2025-05-06 DIAGNOSIS — L57.8 PHOTOAGING OF SKIN: Primary | ICD-10-CM

## 2025-05-06 DIAGNOSIS — D22.72 MELANOCYTIC NEVI OF LEFT LOWER EXTREMITY OR HIP: ICD-10-CM

## 2025-05-06 DIAGNOSIS — D48.5 NEOPLASM OF UNCERTAIN BEHAVIOR OF SKIN: ICD-10-CM

## 2025-05-06 DIAGNOSIS — D22.71 MELANOCYTIC NEVI OF RIGHT LOWER LIMB, INCLUDING HIP: ICD-10-CM

## 2025-05-06 DIAGNOSIS — L82.0 INFLAMED SEBORRHEIC KERATOSIS: ICD-10-CM

## 2025-05-06 DIAGNOSIS — Z85.828 PERSONAL HISTORY OF OTHER MALIGNANT NEOPLASM OF SKIN: ICD-10-CM

## 2025-05-06 DIAGNOSIS — Z12.83 ENCOUNTER FOR SCREENING FOR MALIGNANT NEOPLASM OF SKIN: ICD-10-CM

## 2025-05-06 DIAGNOSIS — L81.4 LENTIGO: ICD-10-CM

## 2025-05-06 PROCEDURE — 11102 TANGNTL BX SKIN SINGLE LES: CPT | Performed by: STUDENT IN AN ORGANIZED HEALTH CARE EDUCATION/TRAINING PROGRAM

## 2025-05-06 PROCEDURE — 17110 DESTRUCTION B9 LES UP TO 14: CPT | Performed by: STUDENT IN AN ORGANIZED HEALTH CARE EDUCATION/TRAINING PROGRAM

## 2025-05-06 PROCEDURE — 99213 OFFICE O/P EST LOW 20 MIN: CPT | Performed by: DERMATOLOGY

## 2025-05-06 RX ORDER — LORAZEPAM 0.5 MG/1
0.5 TABLET ORAL DAILY PRN
Qty: 7 TABLET | Refills: 0 | Status: SHIPPED | OUTPATIENT
Start: 2025-05-06 | End: 2025-08-04

## 2025-05-06 NOTE — PROGRESS NOTES
Subjective     Sivla Chance is a 54 y.o. female who presents for the following: Skin Check (Annual - LV 04/30/24 - Patient has a history of: Hyperhidrosis (axillae), /DN - 04/09/03, [place]  Area(s) of concern: Anterior Neck (x2) that have been present for apprx 4-6 months that have been itching.  Left breast was biopsied apprx 3 years and has now returned and is now itching./).          Review of Systems:  No other skin or systemic complaints other than what is documented elsewhere in the note.    The following portions of the chart were reviewed this encounter and updated as appropriate:         Skin Cancer History  Biopsy Log Book  No skin cancers from Specimen Tracking.    Additional History      Specialty Problems    None       Objective   Well appearing patient in no apparent distress; mood and affect are within normal limits.    A full examination was performed including scalp, head, eyes, ears, nose, lips, neck, chest, axillae, abdomen, back, buttocks, bilateral upper extremities, bilateral lower extremities, hands, feet, fingers, toes, fingernails, and toenails. All findings within normal limits unless otherwise noted below.    Declined examination of genitals, does see provider for routine pelvic examinations.       Assessment/Plan   Skin Exam  1. PHOTOAGING OF SKIN  Generalized  Mottled pigmentation with telangiectasias and brown reticular macules in sun exposed areas of the body.  The risk of chronic, cumulative sun damage and risk of development of skin cancer was reviewed today.   The importance of sun protection was reviewed: including the use of a broad spectrum sunscreen that protects against both UVA/UVB rays, with ingredients such as Zinc oxide or titanium dioxide, wearing sun protective clothing and sun avoidance. We reviewed the warning signs of non-melanoma skin cancer and ABCDEs of melanoma  Please follow up should you notice any new or changing pre-existing skin lesion.  2. INFLAMED SEBORRHEIC  "KERATOSIS (2)  Chest - Medial (Center), Suprasternal Area   brown \"stuck on\" verrucous scaly papules with surrounding erythema  The benign nature of these skin lesions reviewed, reassure provided. Due to symptomatic nature, patient offered treatment with LN2 vs. Curettage vs hyfrecation. Patient elected for hyfrecation of 2 lesions. See procedure note.   Destr of lesion - Chest - Medial (Center), Suprasternal Area  Complexity: simple    Destruction method comment:  Hyfrecation  Informed consent: discussed and consent obtained    Outcome: patient tolerated procedure well with no complications    Post-procedure details: wound care instructions given    3. NEOPLASM OF UNCERTAIN BEHAVIOR OF SKIN  Left Upper Back  Erythematous scaly plaque    Lesion biopsy  Type of biopsy: tangential    Informed consent: discussed and consent obtained    Timeout: patient name, date of birth, surgical site, and procedure verified    Procedure prep:  Patient was prepped and draped  Anesthesia: the lesion was anesthetized in a standard fashion    Anesthetic:  1% lidocaine w/ epinephrine 1-100,000 local infiltration  Instrument used: DermaBlade    Hemostasis achieved with: aluminum chloride    Outcome: patient tolerated procedure well    Post-procedure details: sterile dressing applied and wound care instructions given    Dressing type: petrolatum and bandage    Specimen 1 - Dermatopathology- DERM LAB  Differential Diagnosis: basal cell carcinoma vs squamous cell carcinoma  Check Margins No:    Comments:    Dermpath Lab: Routine Histopathology (formalin-fixed tissue)  Lesion concerning for basal cell carcinoma vs squamous cell carcinoma. The need for biopsy for definitive diagnosis recommended. Risks and benefits reviewed, see procedure note.  4. HEMANGIOMA OF SKIN  Generalized  Cherry red papules  The benign nature of these skin lesions were reviewed, no treatment is necessary.   Please follow up for any new or pre-existing lesion that is " changing in size, shape, color, becomes painful, tender, itches or bleed.  5. PERSONAL HISTORY OF OTHER MALIGNANT NEOPLASM OF SKIN  Left Lower Leg - Anterior  Well healed scar at site of prior treatment without evidence of recurrence.  History of atypical neoplasm s/p WLE > 10 years ago.   There is no evidence of recurrence on clinical examination today, reassurance was provided to the patient. The importance of sun protection was reviewed with the patient including the use of a broad spectrum sunscreen that protects against both UVA/UVB rays, with ingredients such as Zinc oxide or titanium dioxide, wearing sun protective clothing and sun avoidance. Warning signs of non-melanoma skin cancer were reviewed. ABCDEs of melanoma reviewed. Patient to f/u should they notice any new or changing pre-existing skin lesion  6. LENTIGO  Generalized  Scattered tan macules in sun-exposed areas.  These are benign skin lesions due to sun exposure. They will darken in response to sun exposure. They should be monitored for change in size, shape or color.  These lesions can be treated cosmetically with topical creams, liquid nitrogen and a variety of lasers.  7. DERMATOFIBROMA  Right Thigh - Anterior  Firm pink papule with hyperpigmented halo, +dimple sign  Benign, scar tissue like growth that most frequently is due to bug bite or ingrown hair. No treatment is necessary.  8. MELANOCYTIC NEVI OF UNSPECIFIED UPPER LIMB, INCLUDING SHOULDER (2)  Left Arm, Right Arm  Scattered, uniform and benign-appearing, regular brown melanocytic papules and macules.  Clinically benign appearing nevi, no treatment is necessary.  The importance of sun protection was reviewed: including the use of a broad spectrum sunscreen that protects against both UVA/UVB rays, with ingredients such as Zinc oxide or titanium dioxide, wearing sun protective clothing and sun avoidance.   ABCDEs of melanoma reviewed.  Please follow up should you notice any new or changing  pre-existing skin lesion.  9. MELANOCYTIC NEVI OF TRUNK (3)  Abdomen (Lower Torso, Anterior), Left Breast, Torso - Posterior (Back)  Tan-brown symmetric macules and papules    Well healed scar at left breast with brown symmetric macule  Clinically benign appearing nevi, no treatment is necessary.  The importance of sun protection was reviewed: including the use of a broad spectrum sunscreen that protects against both UVA/UVB rays, with ingredients such as Zinc oxide or titanium dioxide, wearing sun protective clothing and sun avoidance.   ABCDEs of melanoma reviewed.  Please follow up should you notice any new or changing pre-existing skin lesion.  10. MELANOCYTIC NEVI OF LEFT LOWER EXTREMITY OR HIP  Left Leg  Scattered, uniform and benign-appearing, regular brown melanocytic papules and macules.  Clinically benign appearing nevi, no treatment is necessary.  The importance of sun protection was reviewed: including the use of a broad spectrum sunscreen that protects against both UVA/UVB rays, with ingredients such as Zinc oxide or titanium dioxide, wearing sun protective clothing and sun avoidance.   ABCDEs of melanoma reviewed.  Please follow up should you notice any new or changing pre-existing skin lesion.  11. MELANOCYTIC NEVI OF RIGHT LOWER LIMB, INCLUDING HIP  Right Leg  Scattered, uniform and benign-appearing, regular brown melanocytic papules and macules.  Clinically benign appearing nevi, no treatment is necessary.  The importance of sun protection was reviewed: including the use of a broad spectrum sunscreen that protects against both UVA/UVB rays, with ingredients such as Zinc oxide or titanium dioxide, wearing sun protective clothing and sun avoidance.   ABCDEs of melanoma reviewed.  Please follow up should you notice any new or changing pre-existing skin lesion.  12. PRIMARY FOCAL HYPERHIDROSIS OF AXILLA (2)  Left Axilla, Right Axilla  Minimal perspiration  Well controlled on botulinum toxin injections  100 units (50 each axilla) every 3 months.  Related Procedures  Prior Authorization for Hyperhidrosis and Botox  Related Medications  Botox 100 unit injection  PHYSICIAN TO INJECT 50 UNITS TO EACH AXILLA EVERY 3 MONTHS  13. ENCOUNTER FOR SCREENING FOR MALIGNANT NEOPLASM OF SKIN  Generalized  Lesion of concern on the left upper back (see NUB)  The risk of chronic, cumulative sun damage and risk of development of skin cancer was reviewed today.   The importance of sun protection was reviewed: including the use of a broad spectrum sunscreen of at least SPF 30 that protects against both UVA/UVB rays, with ingredients such as Zinc oxide or titanium dioxide, wearing sun protective clothing and sun avoidance. We reviewed the warning signs of non-melanoma skin cancer and ABCDEs of melanoma  Please follow up should you notice any new or changing pre-existing skin lesion.    Follow up pending biopsy results, otherwise yearly for FBSE and as scheduled for Botox injection for hyperhidrosis    Eufemia Caballero MD   PGY4, Department of Dermatology    I saw and evaluated the patient. I personally obtained the key and critical portions of the history and physical exam or was physically present for key and critical portions performed by the resident/fellow. I reviewed the resident/fellow's documentation and discussed the patient with the resident/fellow. I agree with the resident/fellow's medical decision making as documented in the note.    Allison Hargrove DO

## 2025-05-06 NOTE — Clinical Note
Lesion concerning for basal cell carcinoma vs squamous cell carcinoma. The need for biopsy for definitive diagnosis recommended. Risks and benefits reviewed, see procedure note.

## 2025-05-06 NOTE — Clinical Note
History of atypical neoplasm s/p WLE > 10 years ago.   There is no evidence of recurrence on clinical examination today, reassurance was provided to the patient. The importance of sun protection was reviewed with the patient including the use of a broad spectrum sunscreen that protects against both UVA/UVB rays, with ingredients such as Zinc oxide or titanium dioxide, wearing sun protective clothing and sun avoidance. Warning signs of non-melanoma skin cancer were reviewed. ABCDEs of melanoma reviewed. Patient to f/u should they notice any new or changing pre-existing skin lesion

## 2025-05-06 NOTE — Clinical Note
The benign nature of these skin lesions reviewed, reassure provided. Due to symptomatic nature, patient offered treatment with LN2 vs. Curettage vs hyfrecation. Patient elected for hyfrecation of 2 lesions. See procedure note.

## 2025-05-06 NOTE — Clinical Note
Tan-brown symmetric macules and papules    Well healed scar at left breast with brown symmetric macule

## 2025-05-06 NOTE — Clinical Note
Benign, scar tissue like growth that most frequently is due to bug bite or ingrown hair. No treatment is necessary.

## 2025-05-08 LAB
LABORATORY COMMENT REPORT: NORMAL
PATH REPORT.FINAL DX SPEC: NORMAL
PATH REPORT.GROSS SPEC: NORMAL
PATH REPORT.MICROSCOPIC SPEC OTHER STN: NORMAL
PATH REPORT.RELEVANT HX SPEC: NORMAL
PATH REPORT.TOTAL CANCER: NORMAL

## 2025-05-13 ENCOUNTER — APPOINTMENT (OUTPATIENT)
Dept: DERMATOLOGY | Facility: CLINIC | Age: 55
End: 2025-05-13
Payer: COMMERCIAL

## 2025-05-13 DIAGNOSIS — L74.510 PRIMARY FOCAL HYPERHIDROSIS OF AXILLA: ICD-10-CM

## 2025-05-13 PROCEDURE — 1036F TOBACCO NON-USER: CPT | Performed by: DERMATOLOGY

## 2025-05-13 PROCEDURE — 64650 CHEMODENERV ECCRINE GLANDS: CPT | Performed by: DERMATOLOGY

## 2025-05-13 NOTE — Clinical Note
Hyperhidrosis is a common condition in which a person sweats excessively. The sweating may affect the whole of your body, or it may only affect certain areas. Commonly affected areas include the armpits, palms and soles.   It is believed to be due to an underlying thermoregulatory dysfunction.  Treatments include topical therapy with Drysol (which may be drying and irritating to the skin), topical Qbrexza cloths, oral anti-cholinergic medication, iontophoresis devices and in recalcitrant cases can consider Botox injection.    Patient has been doing well on Botox injections for years elected for repeat treatment today. Has PA on file.

## 2025-05-14 NOTE — PROGRESS NOTES
Subjective     Silva Chance is a 54 y.o. female who presents for the following: Excessive Sweating (3 month - LV 02/18/25 - Botox for Hyperhidrosis (100 units - 50 ea axilla)  Patient states she has seen improvement with last treatment.//). Severity index is 1 out of 4 on treatment, previously 3 out of 4.         Review of Systems:  No other skin or systemic complaints other than what is documented elsewhere in the note.    The following portions of the chart were reviewed this encounter and updated as appropriate:          Skin Cancer History  Biopsy Log Book  Biopsied Type Location Status   5/6/25 BCC, Superficial Left Upper Back Patient/Caregiver Informed - Awaiting scheduling       Additional History      Specialty Problems    None       Objective   Well appearing patient in no apparent distress; mood and affect are within normal limits.    A focused skin examination was performed of bilateral axilla. All findings within normal limits unless otherwise noted below.    Assessment/Plan   Skin Exam  1. PRIMARY FOCAL HYPERHIDROSIS OF AXILLA (2)  Left Axilla, Right Axilla  Excessive sweating with the skin visibly damp.  Hyperhidrosis is a common condition in which a person sweats excessively. The sweating may affect the whole of your body, or it may only affect certain areas. Commonly affected areas include the armpits, palms and soles.   It is believed to be due to an underlying thermoregulatory dysfunction.  Treatments include topical therapy with Drysol (which may be drying and irritating to the skin), topical Qbrexza cloths, oral anti-cholinergic medication, iontophoresis devices and in recalcitrant cases can consider Botox injection.    Patient has been doing well on Botox injections for years elected for repeat treatment today. Has PA on file.    Chemodenervation - Left Axilla, Right Axilla    Performed by: Allison Hargrove DO  Authorized by: Allison Hargrove, DO  not cosmetic  Medical - Botulinum toxin injection:   yes    Consent:      Consent obtained:  Written     Consent given by:  Patient     Risks discussed:  Poor cosmetic result     Alternatives discussed:  No treatment    Universal protocol:      Relevant documents present and verified:  Yes       Site/side verified:  Yes       Patient identity confirmed:  Verbally with patient    Pre-procedure details:   Prep type:  Isopropyl alcohol    Procedure details:      Diluted by:  Preservative free saline     Toxin (Brand):  OnaBoNT-A (Botox)     Concentration (u/mL):  2U/0.1mL    Total units injected:  100    Post-procedure details:      Patient tolerance of procedure:  Tolerated well, no immediate complications    Comments: Units injected:100U - 50U each axilla    Related Procedures  Prior Authorization for Hyperhidrosis and Botox  Prior Authorization for Hyperhidrosis and Botox  Prior Authorization for Hyperhidrosis and Botox  Follow Up In Dermatology - Established Patient  Related Medications  Botox 100 unit injection  PHYSICIAN TO INJECT 50 UNITS TO EACH AXILLA EVERY 3 MONTHS  onabotulinumtoxinA (Botox) injection 100 Units      In addition we did review patient's biopsy results from 5/6/25 visit for skin examination that showed superficial BCC. Basal cell carcinoma is a type of skin cancer that most frequently occurs secondary to sun exposure.  If left untreated these lesions can grow large, ulcerate the skin and bleed. In very rare circumstances they can spread to other areas of the body.    Treatment is dependent upon the size of the lesion, location and histological subtype.    We discussed 5FU vs. ED&C vs. Excision. Patient elected for ED&C and appt scheduled for patient.    Follow up in 3 months for Botox injections and as scheduled for ED&C in June 2025.

## 2025-05-28 ENCOUNTER — APPOINTMENT (OUTPATIENT)
Dept: OBSTETRICS AND GYNECOLOGY | Facility: CLINIC | Age: 55
End: 2025-05-28
Payer: COMMERCIAL

## 2025-06-10 ENCOUNTER — APPOINTMENT (OUTPATIENT)
Dept: PRIMARY CARE | Facility: CLINIC | Age: 55
End: 2025-06-10
Payer: COMMERCIAL

## 2025-06-10 VITALS
BODY MASS INDEX: 24.09 KG/M2 | SYSTOLIC BLOOD PRESSURE: 126 MMHG | HEIGHT: 61 IN | OXYGEN SATURATION: 98 % | WEIGHT: 127.6 LBS | DIASTOLIC BLOOD PRESSURE: 86 MMHG | HEART RATE: 75 BPM

## 2025-06-10 DIAGNOSIS — Z00.00 ROUTINE ADULT HEALTH MAINTENANCE: Primary | ICD-10-CM

## 2025-06-10 DIAGNOSIS — I10 BENIGN ESSENTIAL HYPERTENSION: ICD-10-CM

## 2025-06-10 DIAGNOSIS — E55.9 VITAMIN D DEFICIENCY: ICD-10-CM

## 2025-06-10 DIAGNOSIS — E78.1 PURE HYPERTRIGLYCERIDEMIA: ICD-10-CM

## 2025-06-10 DIAGNOSIS — F90.9 ATTENTION DEFICIT HYPERACTIVITY DISORDER (ADHD), UNSPECIFIED ADHD TYPE: ICD-10-CM

## 2025-06-10 PROCEDURE — 3074F SYST BP LT 130 MM HG: CPT | Performed by: INTERNAL MEDICINE

## 2025-06-10 PROCEDURE — 3008F BODY MASS INDEX DOCD: CPT | Performed by: INTERNAL MEDICINE

## 2025-06-10 PROCEDURE — 1036F TOBACCO NON-USER: CPT | Performed by: INTERNAL MEDICINE

## 2025-06-10 PROCEDURE — 3079F DIAST BP 80-89 MM HG: CPT | Performed by: INTERNAL MEDICINE

## 2025-06-10 PROCEDURE — 99396 PREV VISIT EST AGE 40-64: CPT | Performed by: INTERNAL MEDICINE

## 2025-06-10 ASSESSMENT — ENCOUNTER SYMPTOMS
EYE PAIN: 0
DIARRHEA: 0
FREQUENCY: 0
UNEXPECTED WEIGHT CHANGE: 0
RHINORRHEA: 0
MYALGIAS: 0
CONSTIPATION: 0
BACK PAIN: 1
POLYDIPSIA: 0
WOUND: 0
NERVOUS/ANXIOUS: 0
DYSURIA: 0
CHEST TIGHTNESS: 0
DYSPHORIC MOOD: 0
BLOOD IN STOOL: 0
FEVER: 0
SORE THROAT: 0
SHORTNESS OF BREATH: 0
NAUSEA: 0
ARTHRALGIAS: 0
ABDOMINAL PAIN: 0
DIZZINESS: 0
POLYPHAGIA: 0
COUGH: 0
PALPITATIONS: 0
VOMITING: 0
CHILLS: 0
HEADACHES: 0
WHEEZING: 0
HEMATURIA: 0

## 2025-06-10 ASSESSMENT — PATIENT HEALTH QUESTIONNAIRE - PHQ9
2. FEELING DOWN, DEPRESSED OR HOPELESS: NOT AT ALL
SUM OF ALL RESPONSES TO PHQ9 QUESTIONS 1 AND 2: 0
1. LITTLE INTEREST OR PLEASURE IN DOING THINGS: NOT AT ALL

## 2025-06-10 NOTE — PROGRESS NOTES
Subjective   Patient ID: Silva Chance is a 54 y.o. female who presents for Annual Exam.    HPI     Dental visits- UTD  Eye visits- UTD    Exercise- Weights 3x per week, yoga    Alcohol use- none  Smoking-none    Has had some neck pain going to back of head. Has cervical neck issues, Has done PT before. Has exercises to do  Takes occasional advil for it but rare  Flared over last 6-8 weeks    Still has issues sleeping despite estradiol. States some nights 3 hours. Has tried melatonin. Does take a magnesium but unsure what form    OARRS:  No data recorded  I have personally reviewed the OARRS report for Silva Chance. I have considered the risks of abuse, dependence, addiction and diversion and I believe that it is clinically appropriate for Silva Chance to be prescribed this medication    Is the patient prescribed a combination of a benzodiazepine and opioid?  No    Last Urine Drug Screen / ordered today: No  Recent Results (from the past 8760 hours)   Drug Screen, Urine With Reflex to Confirmation    Collection Time: 12/09/24  4:03 PM   Result Value Ref Range    Amphetamine Screen, Urine Presumptive Negative Presumptive Negative    Barbiturate Screen, Urine Presumptive Negative Presumptive Negative    Benzodiazepines Screen, Urine Presumptive Negative Presumptive Negative    Cannabinoid Screen, Urine Presumptive Negative Presumptive Negative    Cocaine Metabolite Screen, Urine Presumptive Negative Presumptive Negative    Fentanyl Screen, Urine Presumptive Negative Presumptive Negative    Opiate Screen, Urine Presumptive Negative Presumptive Negative    Oxycodone Screen, Urine Presumptive Negative Presumptive Negative    PCP Screen, Urine Presumptive Negative Presumptive Negative    Methadone Screen, Urine Presumptive Negative Presumptive Negative     Results are as expected.         Controlled Substance Agreement:  Date of the Last Agreement: 12/24  Reviewed Controlled Substance Agreement including but not limited to  "the benefits, risks, and alternatives to treatment with a Controlled Substance medication(s).    Stimulants:   What is the patient's goal of therapy? Improve ADHD  Is this being achieved with current treatment? yes    Activities of Daily Living:   Is your overall impression that this patient is benefiting (symptom reduction outweighs side effects) from stimulant therapy? Yes     1. Physical Functioning: Better  2. Family Relationship: Better  3. Social Relationship: Better  4. Mood: Better  5. Sleep Patterns: Worse  6. Overall Function: Better      Review of Systems   Constitutional:  Negative for chills, fever and unexpected weight change.   HENT:  Negative for congestion, hearing loss, rhinorrhea and sore throat.    Eyes:  Negative for pain and visual disturbance.   Respiratory:  Negative for cough, chest tightness, shortness of breath and wheezing.    Cardiovascular:  Negative for chest pain and palpitations.   Gastrointestinal:  Negative for abdominal pain, blood in stool, constipation, diarrhea, nausea and vomiting.   Endocrine: Negative for cold intolerance, heat intolerance, polydipsia and polyphagia.   Genitourinary:  Negative for dysuria, frequency and hematuria.   Musculoskeletal:  Positive for back pain. Negative for arthralgias and myalgias.   Skin:  Negative for rash and wound.   Neurological:  Negative for dizziness, syncope and headaches.   Psychiatric/Behavioral:  Negative for dysphoric mood. The patient is not nervous/anxious.        Objective   /86 (BP Location: Right arm, Patient Position: Sitting, BP Cuff Size: Adult)   Pulse 75   Ht 1.549 m (5' 1\")   Wt 57.9 kg (127 lb 9.6 oz)   SpO2 98%   BMI 24.11 kg/m²     Physical Exam  Vitals reviewed.   Constitutional:       Appearance: Normal appearance. She is not ill-appearing.   HENT:      Head: Normocephalic and atraumatic.      Right Ear: Tympanic membrane normal.      Left Ear: Tympanic membrane normal.      Nose: Nose normal.      " Mouth/Throat:      Mouth: Mucous membranes are moist.      Pharynx: Oropharynx is clear.   Eyes:      Extraocular Movements: Extraocular movements intact.      Conjunctiva/sclera: Conjunctivae normal.      Pupils: Pupils are equal, round, and reactive to light.   Cardiovascular:      Rate and Rhythm: Normal rate and regular rhythm.   Pulmonary:      Effort: Pulmonary effort is normal.      Breath sounds: Normal breath sounds. No wheezing.   Abdominal:      General: There is no distension.      Palpations: Abdomen is soft. There is no mass.      Tenderness: There is no abdominal tenderness.   Musculoskeletal:      Cervical back: Neck supple.      Right lower leg: No edema.      Left lower leg: No edema.   Lymphadenopathy:      Cervical: No cervical adenopathy.   Neurological:      General: No focal deficit present.      Mental Status: She is alert and oriented to person, place, and time.      Gait: Gait normal.   Psychiatric:         Mood and Affect: Mood normal.         Behavior: Behavior normal.         Thought Content: Thought content normal.         Assessment/Plan   Problem List Items Addressed This Visit           ICD-10-CM    Benign essential hypertension (Chronic) I10    Relevant Orders    Lipid Panel    Comprehensive Metabolic Panel    CBC    Attention deficit hyperactivity disorder (ADHD) F90.9    Relevant Orders    Drug Screen, Urine With Reflex to Confirmation     Other Visit Diagnoses         Codes      Routine adult health maintenance    -  Primary Z00.00      Vitamin D deficiency     E55.9    Relevant Orders    Vitamin D 25-Hydroxy,Total (for eval of Vitamin D levels)      Pure hypertriglyceridemia     E78.1    Relevant Orders    Lipid Panel          CPE completed.  Advised to keep a heart healthy, low fat  diet with fruits and veggies like Mediterranean diet.  Advised on the importance of exercise and maintaining 150 minutes of exercise per week (30 minutes per day 5 days a week).  Advised on regular  eye and dental visits.  Discussed age appropriate cancer screening, immunizations and recommendations given.  Discussed avoiding illicit drugs and tobacco. Advised on appropriate use of alcohol.  Advised to wear seat belt.    Insomnia-trial magnesium glycinate  -does read books before bed  -discussed could trial trazodone-declines    Neck pain- stretching  -prn advil  -if does not help-PT and imaging    Menopause  -saw Dr. Chapin  -estradiol    Arthralgias- workup per rheum       HTN: controlled on Losartan     Ovarian cyst-on intrarosa     Colitis: resolved     ADHD:   -straterra caused dizziness  -high BP with vyvanse--resume and track BP> Trial lower dose--has worked well  -CSA and UDS 12/9/24  -bupropion did not help in past  -does not want to try anything     GERD- omeprazole     S/p hyst with hx of prolapse-send to pelvic floor therapy     Flight anxiety/travel     Recurrent UTI  -macrobid prn     Hx of galactorrhea and had surgery     Hyperhidrosis: does botox with derm     Followup 6 months and labs before with urine     Health Maintenance  -Pap smear: s/p hyst with cervix removed  -Vaccinations: UTD shingrix, tdap, flu  -Mammogram: 1/25- normal  -Colonoscopy: 10/22- 7 years  -DEXA: 4/24- osteopenia- repeat 2-3 years     . Lives in [place]. 2nd home in [place]  [address]

## 2025-06-11 DIAGNOSIS — Z01.419 WELL WOMAN EXAM: ICD-10-CM

## 2025-06-11 DIAGNOSIS — N95.2 VAGINAL ATROPHY: ICD-10-CM

## 2025-06-11 RX ORDER — ESTRADIOL 0.1 MG/G
CREAM VAGINAL
Qty: 42.5 G | Refills: 3 | Status: SHIPPED | OUTPATIENT
Start: 2025-06-11

## 2025-06-13 DIAGNOSIS — F90.9 ATTENTION DEFICIT HYPERACTIVITY DISORDER (ADHD), UNSPECIFIED ADHD TYPE: ICD-10-CM

## 2025-06-13 RX ORDER — LISDEXAMFETAMINE DIMESYLATE 10 MG/1
10 CAPSULE ORAL EVERY MORNING
Qty: 30 CAPSULE | Refills: 0 | Status: SHIPPED | OUTPATIENT
Start: 2025-06-13 | End: 2025-07-13

## 2025-06-24 ENCOUNTER — APPOINTMENT (OUTPATIENT)
Dept: DERMATOLOGY | Facility: CLINIC | Age: 55
End: 2025-06-24
Payer: COMMERCIAL

## 2025-06-24 DIAGNOSIS — C44.519 BASAL CELL CARCINOMA (BCC) OF SKIN OF TRUNK: Primary | ICD-10-CM

## 2025-06-24 PROCEDURE — 17262 DSTRJ MAL LES T/A/L 1.1-2.0: CPT | Performed by: DERMATOLOGY

## 2025-06-24 NOTE — Clinical Note
Basal cell carcinoma is a type of skin cancer that most frequently occurs secondary to sun exposure.  If left untreated these lesions can grow large, ulcerate the skin and bleed. In very rare circumstances they can spread to other areas of the body.    Treatment is dependent upon the size of the lesion, location and histological subtype.    Patient elected for ED&C today after discussion of risks and benefits of procedure

## 2025-06-25 NOTE — PROGRESS NOTES
Hever Chance is a 55 y.o. female who presents for the following: Procedure (Procedure  - ED&C of the Left Upper Back for BCC).          Review of Systems:  No other skin or systemic complaints other than what is documented elsewhere in the note.    The following portions of the chart were reviewed this encounter and updated as appropriate:          Skin Cancer History  Biopsy Log Book  Biopsied Type Location Status   5/6/25 BCC, Superficial Left Upper Back Patient/Caregiver Informed - Awaiting scheduling       Additional History      Specialty Problems    None       Objective   Well appearing patient in no apparent distress; mood and affect are within normal limits.    A focused skin examination was performed of the left upper back. All findings within normal limits unless otherwise noted below.    Assessment/Plan   Skin Exam  1. BASAL CELL CARCINOMA (BCC) OF SKIN OF TRUNK  Left Upper Back  Scar from biopsy  Staff Communication: Dermatology Local Anesthesia: 1 % Lidocaine / Epinephrine - Amount:3cc    Destr of lesion  Complexity: simple    Destruction method: electrodesiccation and curettage    Informed consent: discussed and consent obtained    Timeout:  patient name, date of birth, surgical site, and procedure verified  Procedure prep:  Patient prepped in sterile fashion  Anesthesia: the lesion was anesthetized in a standard fashion    Anesthetic:  1% lidocaine w/ epinephrine 1-100,000 local infiltration  Curettage performed in three different directions: Yes    Electrodesiccation performed over the curetted area: Yes    Curettage cycles:  3  Lesion length (cm):  0.7  Lesion width (cm):  0.7  Margin per side (cm):  0.3  Final wound size (cm):  1.3  Hemostasis achieved with:  electrodesiccation  Outcome: patient tolerated procedure well with no complications    Post-procedure details: sterile dressing applied and wound care instructions given    Dressing type: petrolatum and pressure dressing    Basal  cell carcinoma is a type of skin cancer that most frequently occurs secondary to sun exposure.  If left untreated these lesions can grow large, ulcerate the skin and bleed. In very rare circumstances they can spread to other areas of the body.    Treatment is dependent upon the size of the lesion, location and histological subtype.    Patient elected for ED&C today after discussion of risks and benefits of procedure

## 2025-07-15 DIAGNOSIS — I10 HYPERTENSION, UNSPECIFIED TYPE: ICD-10-CM

## 2025-07-15 RX ORDER — LOSARTAN POTASSIUM 50 MG/1
50 TABLET ORAL DAILY
Qty: 90 TABLET | Refills: 1 | Status: SHIPPED | OUTPATIENT
Start: 2025-07-15

## 2025-07-20 ENCOUNTER — PATIENT MESSAGE (OUTPATIENT)
Dept: PRIMARY CARE | Facility: CLINIC | Age: 55
End: 2025-07-20
Payer: COMMERCIAL

## 2025-07-20 DIAGNOSIS — N39.0 FREQUENT UTI: ICD-10-CM

## 2025-07-21 RX ORDER — NITROFURANTOIN 25; 75 MG/1; MG/1
100 CAPSULE ORAL DAILY PRN
Qty: 30 CAPSULE | Refills: 0 | Status: SHIPPED | OUTPATIENT
Start: 2025-07-21

## 2025-07-29 ENCOUNTER — PATIENT MESSAGE (OUTPATIENT)
Dept: PRIMARY CARE | Facility: CLINIC | Age: 55
End: 2025-07-29
Payer: COMMERCIAL

## 2025-07-29 DIAGNOSIS — M25.551 BILATERAL HIP PAIN: ICD-10-CM

## 2025-07-29 DIAGNOSIS — M25.552 BILATERAL HIP PAIN: ICD-10-CM

## 2025-07-30 RX ORDER — IBUPROFEN 800 MG/1
800 TABLET, FILM COATED ORAL DAILY PRN
Qty: 60 TABLET | Refills: 0 | Status: SHIPPED | OUTPATIENT
Start: 2025-07-30

## 2025-08-07 ENCOUNTER — TELEPHONE (OUTPATIENT)
Dept: DERMATOLOGY | Facility: CLINIC | Age: 55
End: 2025-08-07
Payer: COMMERCIAL

## 2025-08-15 ENCOUNTER — APPOINTMENT (OUTPATIENT)
Dept: DERMATOLOGY | Facility: CLINIC | Age: 55
End: 2025-08-15
Payer: COMMERCIAL

## 2025-08-15 DIAGNOSIS — L74.510 PRIMARY FOCAL HYPERHIDROSIS OF AXILLA: ICD-10-CM

## 2025-08-21 ENCOUNTER — PATIENT MESSAGE (OUTPATIENT)
Dept: PRIMARY CARE | Facility: CLINIC | Age: 55
End: 2025-08-21
Payer: COMMERCIAL

## 2025-08-21 DIAGNOSIS — F90.9 ATTENTION DEFICIT HYPERACTIVITY DISORDER (ADHD), UNSPECIFIED ADHD TYPE: ICD-10-CM

## 2025-08-21 RX ORDER — LISDEXAMFETAMINE DIMESYLATE 10 MG/1
10 CAPSULE ORAL EVERY MORNING
Qty: 30 CAPSULE | Refills: 0 | Status: SHIPPED | OUTPATIENT
Start: 2025-08-21 | End: 2025-09-20

## 2025-09-17 ENCOUNTER — APPOINTMENT (OUTPATIENT)
Dept: OBSTETRICS AND GYNECOLOGY | Facility: CLINIC | Age: 55
End: 2025-09-17
Payer: COMMERCIAL

## 2025-10-29 ENCOUNTER — APPOINTMENT (OUTPATIENT)
Dept: OBSTETRICS AND GYNECOLOGY | Facility: CLINIC | Age: 55
End: 2025-10-29
Payer: COMMERCIAL

## 2025-11-21 ENCOUNTER — APPOINTMENT (OUTPATIENT)
Dept: DERMATOLOGY | Facility: CLINIC | Age: 55
End: 2025-11-21
Payer: COMMERCIAL

## 2025-12-04 ENCOUNTER — APPOINTMENT (OUTPATIENT)
Dept: PRIMARY CARE | Facility: CLINIC | Age: 55
End: 2025-12-04
Payer: COMMERCIAL

## 2026-05-12 ENCOUNTER — APPOINTMENT (OUTPATIENT)
Dept: DERMATOLOGY | Facility: CLINIC | Age: 56
End: 2026-05-12
Payer: COMMERCIAL